# Patient Record
Sex: FEMALE | Race: WHITE | NOT HISPANIC OR LATINO | Employment: FULL TIME | URBAN - METROPOLITAN AREA
[De-identification: names, ages, dates, MRNs, and addresses within clinical notes are randomized per-mention and may not be internally consistent; named-entity substitution may affect disease eponyms.]

---

## 2017-05-12 ENCOUNTER — HOSPITAL ENCOUNTER (OUTPATIENT)
Dept: RADIOLOGY | Facility: MEDICAL CENTER | Age: 57
Discharge: HOME | End: 2017-05-12
Payer: COMMERCIAL

## 2017-05-12 ENCOUNTER — ALLSCRIPTS OFFICE VISIT (OUTPATIENT)
Dept: OTHER | Facility: OTHER | Age: 57
End: 2017-05-12

## 2017-05-12 DIAGNOSIS — R22.1 LOCALIZED SWELLING, MASS OR LUMP OF NECK: ICD-10-CM

## 2017-05-12 DIAGNOSIS — Z72.0 TOBACCO USE: ICD-10-CM

## 2017-05-12 DIAGNOSIS — R03.0 ELEVATED BLOOD PRESSURE READING WITHOUT DIAGNOSIS OF HYPERTENSION: ICD-10-CM

## 2017-05-12 DIAGNOSIS — R19.7 DIARRHEA: ICD-10-CM

## 2017-05-12 DIAGNOSIS — K21.9 GASTRO-ESOPHAGEAL REFLUX DISEASE WITHOUT ESOPHAGITIS: ICD-10-CM

## 2017-05-12 DIAGNOSIS — R06.83 SNORING: ICD-10-CM

## 2017-05-12 DIAGNOSIS — R93.89 ABNORMAL FINDINGS ON DIAGNOSTIC IMAGING OF OTHER SPECIFIED BODY STRUCTURES: ICD-10-CM

## 2017-05-12 DIAGNOSIS — R04.2 HEMOPTYSIS: ICD-10-CM

## 2017-05-12 PROCEDURE — 71020 HB CHEST X-RAY 2VW FRONTAL&LATL: CPT

## 2017-05-13 ENCOUNTER — APPOINTMENT (OUTPATIENT)
Dept: LAB | Facility: MEDICAL CENTER | Age: 57
End: 2017-05-13
Payer: COMMERCIAL

## 2017-05-13 DIAGNOSIS — R03.0 ELEVATED BLOOD PRESSURE READING WITHOUT DIAGNOSIS OF HYPERTENSION: ICD-10-CM

## 2017-05-13 DIAGNOSIS — R04.2 HEMOPTYSIS: ICD-10-CM

## 2017-05-13 DIAGNOSIS — R19.7 DIARRHEA: ICD-10-CM

## 2017-05-13 DIAGNOSIS — R22.1 LOCALIZED SWELLING, MASS OR LUMP OF NECK: ICD-10-CM

## 2017-05-13 DIAGNOSIS — K21.9 GASTRO-ESOPHAGEAL REFLUX DISEASE WITHOUT ESOPHAGITIS: ICD-10-CM

## 2017-05-13 DIAGNOSIS — R06.83 SNORING: ICD-10-CM

## 2017-05-13 LAB
ALBUMIN SERPL BCP-MCNC: 3.8 G/DL (ref 3.5–5)
ALP SERPL-CCNC: 71 U/L (ref 46–116)
ALT SERPL W P-5'-P-CCNC: 32 U/L (ref 12–78)
ANION GAP SERPL CALCULATED.3IONS-SCNC: 8 MMOL/L (ref 4–13)
AST SERPL W P-5'-P-CCNC: 19 U/L (ref 5–45)
BASOPHILS # BLD AUTO: 0.03 THOUSANDS/ΜL (ref 0–0.1)
BASOPHILS NFR BLD AUTO: 1 % (ref 0–1)
BILIRUB SERPL-MCNC: 0.5 MG/DL (ref 0.2–1)
BUN SERPL-MCNC: 15 MG/DL (ref 5–25)
CALCIUM SERPL-MCNC: 9.5 MG/DL (ref 8.3–10.1)
CHLORIDE SERPL-SCNC: 104 MMOL/L (ref 100–108)
CHOLEST SERPL-MCNC: 256 MG/DL (ref 50–200)
CO2 SERPL-SCNC: 27 MMOL/L (ref 21–32)
CREAT SERPL-MCNC: 0.72 MG/DL (ref 0.6–1.3)
EOSINOPHIL # BLD AUTO: 0.13 THOUSAND/ΜL (ref 0–0.61)
EOSINOPHIL NFR BLD AUTO: 2 % (ref 0–6)
ERYTHROCYTE [DISTWIDTH] IN BLOOD BY AUTOMATED COUNT: 12.4 % (ref 11.6–15.1)
GFR SERPL CREATININE-BSD FRML MDRD: >60 ML/MIN/1.73SQ M
GLUCOSE P FAST SERPL-MCNC: 80 MG/DL (ref 65–99)
HCT VFR BLD AUTO: 50.3 % (ref 34.8–46.1)
HDLC SERPL-MCNC: 56 MG/DL (ref 40–60)
HGB BLD-MCNC: 16.1 G/DL (ref 11.5–15.4)
LDLC SERPL CALC-MCNC: 180 MG/DL (ref 0–100)
LYMPHOCYTES # BLD AUTO: 2.14 THOUSANDS/ΜL (ref 0.6–4.47)
LYMPHOCYTES NFR BLD AUTO: 32 % (ref 14–44)
MCH RBC QN AUTO: 30.7 PG (ref 26.8–34.3)
MCHC RBC AUTO-ENTMCNC: 32 G/DL (ref 31.4–37.4)
MCV RBC AUTO: 96 FL (ref 82–98)
MONOCYTES # BLD AUTO: 0.66 THOUSAND/ΜL (ref 0.17–1.22)
MONOCYTES NFR BLD AUTO: 10 % (ref 4–12)
NEUTROPHILS # BLD AUTO: 3.68 THOUSANDS/ΜL (ref 1.85–7.62)
NEUTS SEG NFR BLD AUTO: 55 % (ref 43–75)
NRBC BLD AUTO-RTO: 0 /100 WBCS
PLATELET # BLD AUTO: 249 THOUSANDS/UL (ref 149–390)
PMV BLD AUTO: 11.5 FL (ref 8.9–12.7)
POTASSIUM SERPL-SCNC: 4.2 MMOL/L (ref 3.5–5.3)
PROT SERPL-MCNC: 7.2 G/DL (ref 6.4–8.2)
RBC # BLD AUTO: 5.25 MILLION/UL (ref 3.81–5.12)
SODIUM SERPL-SCNC: 139 MMOL/L (ref 136–145)
TRIGL SERPL-MCNC: 100 MG/DL
TSH SERPL DL<=0.05 MIU/L-ACNC: 1.94 UIU/ML (ref 0.36–3.74)
WBC # BLD AUTO: 6.66 THOUSAND/UL (ref 4.31–10.16)

## 2017-05-13 PROCEDURE — 84443 ASSAY THYROID STIM HORMONE: CPT

## 2017-05-13 PROCEDURE — 36415 COLL VENOUS BLD VENIPUNCTURE: CPT

## 2017-05-13 PROCEDURE — 80061 LIPID PANEL: CPT

## 2017-05-13 PROCEDURE — 85025 COMPLETE CBC W/AUTO DIFF WBC: CPT

## 2017-05-13 PROCEDURE — 80053 COMPREHEN METABOLIC PANEL: CPT

## 2017-05-15 ENCOUNTER — GENERIC CONVERSION - ENCOUNTER (OUTPATIENT)
Dept: OTHER | Facility: OTHER | Age: 57
End: 2017-05-15

## 2017-05-16 ENCOUNTER — HOSPITAL ENCOUNTER (OUTPATIENT)
Dept: RADIOLOGY | Facility: MEDICAL CENTER | Age: 57
Discharge: HOME | End: 2017-05-16
Payer: COMMERCIAL

## 2017-05-16 DIAGNOSIS — Z72.0 TOBACCO USE: ICD-10-CM

## 2017-05-17 ENCOUNTER — GENERIC CONVERSION - ENCOUNTER (OUTPATIENT)
Dept: OTHER | Facility: OTHER | Age: 57
End: 2017-05-17

## 2017-05-24 ENCOUNTER — HOSPITAL ENCOUNTER (OUTPATIENT)
Dept: RADIOLOGY | Facility: MEDICAL CENTER | Age: 57
Discharge: HOME | End: 2017-05-24
Payer: COMMERCIAL

## 2017-05-24 DIAGNOSIS — R22.1 LOCALIZED SWELLING, MASS OR LUMP OF NECK: ICD-10-CM

## 2017-05-24 PROCEDURE — 76536 US EXAM OF HEAD AND NECK: CPT

## 2017-05-25 ENCOUNTER — GENERIC CONVERSION - ENCOUNTER (OUTPATIENT)
Dept: OTHER | Facility: OTHER | Age: 57
End: 2017-05-25

## 2017-06-02 ENCOUNTER — HOSPITAL ENCOUNTER (OUTPATIENT)
Dept: RADIOLOGY | Facility: MEDICAL CENTER | Age: 57
Discharge: HOME | End: 2017-06-02
Payer: COMMERCIAL

## 2017-06-02 ENCOUNTER — HOSPITAL ENCOUNTER (EMERGENCY)
Facility: HOSPITAL | Age: 57
Discharge: HOME | End: 2017-06-02
Attending: EMERGENCY MEDICINE
Payer: COMMERCIAL

## 2017-06-02 VITALS
RESPIRATION RATE: 18 BRPM | TEMPERATURE: 97.8 F | WEIGHT: 195.55 LBS | SYSTOLIC BLOOD PRESSURE: 137 MMHG | HEART RATE: 68 BPM | DIASTOLIC BLOOD PRESSURE: 98 MMHG | OXYGEN SATURATION: 96 %

## 2017-06-02 DIAGNOSIS — R93.89 ABNORMAL FINDINGS ON DIAGNOSTIC IMAGING OF OTHER SPECIFIED BODY STRUCTURES: ICD-10-CM

## 2017-06-02 DIAGNOSIS — T78.2XXA: Primary | ICD-10-CM

## 2017-06-02 DIAGNOSIS — R22.1 LOCALIZED SWELLING, MASS OR LUMP OF NECK: ICD-10-CM

## 2017-06-02 PROCEDURE — 96360 HYDRATION IV INFUSION INIT: CPT

## 2017-06-02 PROCEDURE — 70491 CT SOFT TISSUE NECK W/DYE: CPT

## 2017-06-02 PROCEDURE — 99284 EMERGENCY DEPT VISIT MOD MDM: CPT

## 2017-06-02 RX ORDER — ASCORBIC ACID 500 MG
1000 TABLET ORAL DAILY
COMMUNITY

## 2017-06-02 RX ORDER — MULTIVIT-MIN/IRON FUM/FOLIC AC 7.5 MG-4
2 TABLET ORAL DAILY
COMMUNITY

## 2017-06-02 RX ADMIN — IOHEXOL 85 ML: 350 INJECTION, SOLUTION INTRAVENOUS at 13:09

## 2017-06-02 RX ADMIN — SODIUM CHLORIDE 1000 ML: 0.9 INJECTION, SOLUTION INTRAVENOUS at 15:01

## 2017-06-05 ENCOUNTER — GENERIC CONVERSION - ENCOUNTER (OUTPATIENT)
Dept: OTHER | Facility: OTHER | Age: 57
End: 2017-06-05

## 2017-06-13 ENCOUNTER — ALLSCRIPTS OFFICE VISIT (OUTPATIENT)
Dept: OTHER | Facility: OTHER | Age: 57
End: 2017-06-13

## 2017-06-13 DIAGNOSIS — E04.1 NONTOXIC SINGLE THYROID NODULE: ICD-10-CM

## 2017-08-14 ENCOUNTER — ALLSCRIPTS OFFICE VISIT (OUTPATIENT)
Dept: OTHER | Facility: OTHER | Age: 57
End: 2017-08-14

## 2017-08-21 ENCOUNTER — ALLSCRIPTS OFFICE VISIT (OUTPATIENT)
Dept: OTHER | Facility: OTHER | Age: 57
End: 2017-08-21

## 2017-09-25 DIAGNOSIS — E78.5 HYPERLIPIDEMIA: ICD-10-CM

## 2018-01-09 NOTE — RESULT NOTES
Verified Results  * CT LUNG SCREENING PROGRAM [id number] 10:22AM Anette Quick Order Number: [id number]    - Patient Instructions: To schedule this appointment, please contact Central Scheduling at [phone]  Test Name Result Flag Reference   CT LUNG SCREENING PROGRAM (Report)     CT CHEST LUNG CANCER SCREENING WITHOUT IV CONTRAST     INDICATION: Long term smoking history  Occasional chest tightness  COMPARISON: 5/12/2015     TECHNIQUE: Unenhanced CT examination of the chest was performed utilizing a low dose protocol  Reformatted images were created in axial, sagittal, and coronal planes  Radiation dose length product (DLP) for this visit: 130 mGy-cm   This examination, like all CT scans performed in the [facility], was performed utilizing techniques to minimize radiation dose exposure, including the use of iterative    reconstruction and automated exposure control  FINDINGS:     LUNGS: Unremarkable  No pulmonary mass  No tracheal or endobronchial lesion  PLEURA: Unremarkable  HEART/GREAT VESSELS: Unremarkable for patient's age  MEDIASTINUM AND PANFILO: Unremarkable  CHEST WALL AND LOWER NECK: Unremarkable  VISUALIZED STRUCTURES IN THE UPPER ABDOMEN: Unremarkable status post cholecystectomy  OSSEOUS STRUCTURES: No acute fracture  No destructive osseous lesion  IMPRESSION:     No lung nodule or focal consolidation  Lung-RADS: Lung-RADS1, negative  Continued annual screening with LDCT in 12 months  Workstation performed: [id number]     Signed by:    Edwin Ralph MD   5/16/17

## 2018-01-11 NOTE — RESULT NOTES
Verified Results  (1) CBC/PLT/DIFF [id number] 07:28AM Karmen Camara Order Number: [id number]     Test Name Result Flag Reference   WBC COUNT 6 66 Thousand/uL  4 31-10 16   RBC COUNT 5 25 Million/uL H 3 81-5 12   HEMOGLOBIN 16 1 g/dL H 11 5-15 4   HEMATOCRIT 50 3 % H 34 8-46  1   MCV 96 fL  82-98   MCH 30 7 pg  26 8-34 3   MCHC 32 0 g/dL  31 4-37 4   RDW 12 4 %  11 6-15 1   MPV 11 5 fL  8 9-12 7   PLATELET COUNT 880 Thousands/uL  149-390   nRBC AUTOMATED 0 /100 WBCs     NEUTROPHILS RELATIVE PERCENT 55 %  43-75   LYMPHOCYTES RELATIVE PERCENT 32 %  14-44   MONOCYTES RELATIVE PERCENT 10 %  4-12   EOSINOPHILS RELATIVE PERCENT 2 %  0-6   BASOPHILS RELATIVE PERCENT 1 %  0-1   NEUTROPHILS ABSOLUTE COUNT 3 68 Thousands/? ??L  1 85-7 62   LYMPHOCYTES ABSOLUTE COUNT 2 14 Thousands/? ??L  0 60-4 47   MONOCYTES ABSOLUTE COUNT 0 66 Thousand/? ??L  0 17-1 22   EOSINOPHILS ABSOLUTE COUNT 0 13 Thousand/? ??L  0 00-0 61   BASOPHILS ABSOLUTE COUNT 0 03 Thousands/? ??L  0 00-0 10     (1) COMPREHENSIVE METABOLIC PANEL [id number] 62:09TO Karmen Camara Order Number: [id number]     Test Name Result Flag Reference   SODIUM 139 mmol/L  136-145   POTASSIUM 4 2 mmol/L  3 5-5 3   CHLORIDE 104 mmol/L  100-108   CARBON DIOXIDE 27 mmol/L  21-32   ANION GAP (CALC) 8 mmol/L  4-13   BLOOD UREA NITROGEN 15 mg/dL  5-25   CREATININE 0 72 mg/dL  0 60-1 30   Standardized to IDMS reference method   CALCIUM 9 5 mg/dL  8 3-10 1   BILI, TOTAL 0 50 mg/dL  0 20-1 00   ALK PHOSPHATAS 71 U/L     ALT (SGPT) 32 U/L  12-78   AST(SGOT) 19 U/L  5-45   ALBUMIN 3 8 g/dL  3 5-5 0   TOTAL PROTEIN 7 2 g/dL  6 4-8 2   eGFR Non-African American      >60 0 ml/min/1 73sq m   [facility] Disease Education Program recommendations are as follows:  GFR calculation is accurate only with a steady state creatinine  Chronic Kidney disease less than 60 ml/min/1 73 sq  meters  Kidney failure less than 15 ml/min/1 73 sq  meters     GLUCOSE FASTING 80 mg/dL  65-99     (1) TSH WITH FT4 REFLEX [id number] 07:28AM Selene Hidedel Order Number: [id number]     Test Name Result Flag Reference   TSH 1 940 uIU/mL  0 358-3 740   Patients undergoing fluorescein dye angiography may retain small amounts of fluorescein in the body for 48-72 hours post procedure  Samples containing fluorescein can produce falsely depressed TSH values  If the patient had this procedure,a specimen should be resubmitted post fluorescein clearance  The recommended reference ranges for TSH during pregnancy are as follows:  First trimester 0 1 to 2 5 uIU/mL  Second trimester  0 2 to 3 0 uIU/mL  Third trimester 0 3 to 3 0 uIU/m     (1) LIPID PANEL, FASTING [id number] 07:28AM Selene Hidedel Order Number: [id number]     Test Name Result Flag Reference   CHOLESTEROL 256 mg/dL H    HDL,DIRECT 56 mg/dL  40-60   Specimen collection should occur prior to Metamizole administration due to the potential for falsely depressed results  LDL CHOLESTEROL CALCULATED 180 mg/dL H 0-100   Triglyceride:         Normal              <150 mg/dl       Borderline High    150-199 mg/dl       High               200-499 mg/dl       Very High          >499 mg/dl  Cholesterol:         Desirable        <200 mg/dl      Borderline High  200-239 mg/dl      High             >239 mg/dl  HDL Cholesterol:        High    >59 mg/dL      Low     <41 mg/dL  LDL CALCULATED:    This screening LDL is a calculated result  It does not have the accuracy of the Direct Measured LDL in the monitoring of patients with hyperlipidemia and/or statin therapy  Direct Measure LDL (FHO383) must be ordered separately in these patients  TRIGLYCERIDES 100 mg/dL  <=150   Specimen collection should occur prior to N-Acetylcysteine or Metamizole administration due to the potential for falsely depressed results       * XR CHEST PA & LATERAL [id number] 11:08AM Selene Hidedel Order Number: [id number]     Test Name Result Flag Reference   XR CHEST PA & LATERAL (Report)     CHEST - DUAL ENERGY     INDICATION: Hemoptysis     COMPARISON: CT chest 5/12/2015     VIEWS: PA (including soft tissue/bone algorithms) and lateral projections     IMAGES: 4     FINDINGS:        Cardiomediastinal silhouette appears unremarkable  The lungs are clear  No pneumothorax or pleural effusion  Visualized osseous structures appear within normal limits for the patient's age  IMPRESSION:     No active pulmonary disease         Workstation performed: [id number]     Signed by:   Jhonny Howe MD   5/13/17

## 2018-01-13 VITALS
HEIGHT: 66 IN | HEART RATE: 84 BPM | SYSTOLIC BLOOD PRESSURE: 140 MMHG | WEIGHT: 190.31 LBS | DIASTOLIC BLOOD PRESSURE: 80 MMHG | BODY MASS INDEX: 30.58 KG/M2

## 2018-01-13 NOTE — RESULT NOTES
Verified Results  [address] [place] [address] SOFT TISSUE [id number] 10:26AM Valarie Precise Order Number: [id number]   Performing Comments: Right side neck mass   - Patient Instructions: To schedule this appointment, please contact Central Scheduling at [phone]  Test Name Result Flag Reference   US HEAD NECK SOFT TISSUE (Report)     NECK ULTRASOUND     INDICATION: Palpable lump posterior right neck for 2 months, nontender  COMPARISON: None     TECHNIQUE:  Real-time ultrasound of the right neck in the area of palpable concern was performed with a linear transducer with both volumetric sweeps and still imaging techniques  Comparison images of the left neck were also provided  FINDINGS:    There is an area of shadowing which appears to be related to a calcified structure in the posterior right neck in the area of concern measuring up to 1 2 cm  This appears deep to the superficial strap muscles  Similar findings are seen on the left side  Findings raise the possibility of calcification of the deep neck muscles or possibly calcified lymph nodes  The skin and subcutaneous tissues are within normal limits  IMPRESSION:     Areas of shadowing likely related to calcified structures are seen in the posterior neck bilaterally and appear to correspond to the palpable area in the posterior right neck  Although etiology is unclear, considerations include calcification of deep    neck muscles or possibly calcified lymph nodes  CT neck with IV contrast is recommended for further evaluation  ##sigslh##sigslh         ##fuslh3##fuslh3          Workstation performed: [id number]     Signed by:   Cindy Morris DO   5/24/17       Plan  Abnormal ultrasound of neck, Neck mass    · * CT SOFT TISSUE NECK W CONTRAST; Status:Need Information - Financial  Authorization;  Requested for:[id number];

## 2018-01-14 VITALS
WEIGHT: 190 LBS | RESPIRATION RATE: 18 BRPM | SYSTOLIC BLOOD PRESSURE: 146 MMHG | DIASTOLIC BLOOD PRESSURE: 88 MMHG | BODY MASS INDEX: 31.14 KG/M2 | HEART RATE: 86 BPM | TEMPERATURE: 98 F

## 2018-01-14 VITALS
TEMPERATURE: 98.6 F | BODY MASS INDEX: 30.86 KG/M2 | SYSTOLIC BLOOD PRESSURE: 126 MMHG | DIASTOLIC BLOOD PRESSURE: 82 MMHG | WEIGHT: 192 LBS | HEIGHT: 66 IN

## 2018-01-15 VITALS
DIASTOLIC BLOOD PRESSURE: 80 MMHG | RESPIRATION RATE: 16 BRPM | SYSTOLIC BLOOD PRESSURE: 110 MMHG | WEIGHT: 191.13 LBS | HEART RATE: 76 BPM | BODY MASS INDEX: 31.32 KG/M2

## 2018-01-15 NOTE — RESULT NOTES
Verified Results  * CT SOFT TISSUE NECK W CONTRAST 02Jun2017 12:10PM Efrain Sharif Order Number: [id number]   Performing Comments: [id number] VALID 5/25/17 TO 6/24/17 [address] [place] [address]   - Patient Instructions: To schedule this appointment, please contact Central Scheduling at [phone]  Test Name Result Flag Reference   CT SOFT TISSUE NECK W CONTRAST (Report)     CT NECK WITH CONTRAST     INDICATION: 78-year-old female, right neck mass     COMPARISON: 5/24/2017 ultrasound     TECHNIQUE: Contiguous 2 5 mm images were obtained through the neck after administration of intravenous contrast       Radiation dose length product (DLP) for this visit: 692 mGy-cm   This examination, like all CT scans performed in the [facility], was performed utilizing techniques to minimize radiation dose exposure, including the use of iterative    reconstruction and automated exposure control  The patient developed urticaria reaction after contrast administration  She was sent immediately to Dr Davian Barry office and subsequently to the emergency room for assessment  A radiopaque BB was placed on the posterolateral aspect of the right neck, corresponding to site of complaints  IV Contrast: 85 mL of iohexol (OMNIPAQUE)        IMAGE QUALITY: Diagnostic  FINDINGS:     VISUALIZED BRAIN PARENCHYMA: No acute intracranial pathology of the visualized brain parenchyma  VISUALIZED ORBITS AND PARANASAL SINUSES: Normal      NASAL CAVITY AND NASOPHARYNX: Normal      SUPRAHYOID NECK: Normal oral cavity, tongue base, tonsillar fossa and epiglottis  Prevertebral soft tissues are normal         INFRAHYOID NECK: Aryepiglottic folds, laryngeal tissues, and piriform sinuses are normal         THYROID GLAND: Approximately 1 cm enhancing nodule arises from the superficial aspect of the right lobe of the thyroid gland  Ultrasound-guided biopsy may be warranted   This was not visible on the prior ultrasound which was dedicated to specifically    address the more cephalad portion of the neck  PAROTID AND SUBMANDIBULAR GLANDS: Normal      LYMPH NODES: No pathologic or enlarged adenopathy  VASCULAR STRUCTURES: Normal enhancement of the cervical vasculature  THORACIC INLET: Lung apices and upper mediastinum are unremarkable  BONY STRUCTURES: There is prominent facet arthrosis noted at C3-4, C4-5 and C5-6 levels with lateral hypertrophy which appears to correspond to site of patient complaints on the right side  IMPRESSION:   Allergic urticaria reaction after IV contrast managed as described above     Approximately 1 cm indeterminate right lobe thyroid nodule     Multilevel degenerative facet arthrosis with lateral prominence which appears to correspond to site of patient complaints     No evidence of deep right neck soft tissue calcifications to correlate with ultrasound findings     ##sigslh##sigslh               Workstation performed: [id number]     Signed by:   Macrina Conner MD   6/2/17       Plan  Thyroid nodule    · *1 - [facility]  1cm right thyroid nodue    Status: Active   Requested for: [id number]  Care Summary provided  : Yes

## 2018-12-28 ENCOUNTER — OFFICE VISIT (OUTPATIENT)
Dept: OBGYN CLINIC | Facility: MEDICAL CENTER | Age: 58
End: 2018-12-28
Payer: COMMERCIAL

## 2018-12-28 VITALS — DIASTOLIC BLOOD PRESSURE: 100 MMHG | BODY MASS INDEX: 30.48 KG/M2 | WEIGHT: 186 LBS | SYSTOLIC BLOOD PRESSURE: 150 MMHG

## 2018-12-28 DIAGNOSIS — N64.4 BREAST PAIN: Primary | ICD-10-CM

## 2018-12-28 PROCEDURE — 99213 OFFICE O/P EST LOW 20 MIN: CPT | Performed by: PHYSICIAN ASSISTANT

## 2018-12-28 NOTE — PROGRESS NOTES
Assessment/Plan:    Breast pain  Will check imaging, f/u with results when available  To notify should s/sx infection recur    Enc to f/u with PCP re: multiple health concerns, reviewed importance of routine health maintenance exams and studies    Pt expressed understanding and states she will do so  Smoking cessation advised  Declines repeat BP - asymptomatic     Diagnoses and all orders for this visit:    Breast pain  -     US breast left limited (diagnostic); Future  -     Mammo diagnostic left w 3d & cad; Future        Subjective:      Patient ID: Karma Liz is a 59 y o  female  Pt presents for eval of L breast pain  States that approx a week ago, noted pain in L lateral breast  Seemed to worsen over next 1-2 days - when she viewed the area, noticed a pimple-like lesion  Squeezed the lesion and expressed copious discharge  States change in skin no longer present, but area still painful and feels 'lump' under surface  Last mammo 2016, WNL  Family history of breast cancer in maternal aunt at age 45 and ovarian cancer in maternal grandmother at age 80  Benign excisional biopsy of the left breast, 1989 - results benign  Is current everyday smoker    Pt reports she has lost weight over past several months  BP noted to be elevated today  H/o known thyroid nodules        The following portions of the patient's history were reviewed and updated as appropriate: allergies, current medications, past family history, past medical history, past social history, past surgical history and problem list     Review of Systems   Constitutional: Negative for appetite change, fatigue and unexpected weight change  Respiratory: Negative for chest tightness and shortness of breath  Cardiovascular: Negative for chest pain, palpitations and leg swelling  Gastrointestinal: Negative for abdominal pain, constipation, diarrhea, nausea and vomiting     Genitourinary: Negative for difficulty urinating, dyspareunia, menstrual problem, pelvic pain and vaginal discharge  Musculoskeletal: Negative for arthralgias and myalgias  Neurological: Negative for dizziness, light-headedness and headaches  Psychiatric/Behavioral: Negative for dysphoric mood  The patient is not nervous/anxious  All other systems reviewed and are negative  Objective:      /100 (BP Location: Left arm, Patient Position: Sitting)   Wt 84 4 kg (186 lb)   BMI 30 48 kg/m²          Physical Exam   Constitutional: She is oriented to person, place, and time  She appears well-developed and well-nourished  HENT:   Head: Normocephalic and atraumatic  Pulmonary/Chest: Right breast exhibits no mass, no nipple discharge, no skin change and no tenderness  Left breast exhibits mass and tenderness  Left breast exhibits no nipple discharge and no skin change  Breasts are symmetrical    Lymphadenopathy:     She has no axillary adenopathy  Neurological: She is alert and oriented to person, place, and time  Skin: Skin is warm and dry  Psychiatric: She has a normal mood and affect  Nursing note and vitals reviewed

## 2018-12-28 NOTE — ASSESSMENT & PLAN NOTE
Will check imaging, f/u with results when available  To notify should s/sx infection recur    Enc to f/u with PCP re: multiple health concerns, reviewed importance of routine health maintenance exams and studies  Declines BP recheck, asymptomatic

## 2019-01-07 ENCOUNTER — HOSPITAL ENCOUNTER (OUTPATIENT)
Dept: MAMMOGRAPHY | Facility: CLINIC | Age: 59
Discharge: HOME | End: 2019-01-07
Payer: COMMERCIAL

## 2019-01-07 ENCOUNTER — HOSPITAL ENCOUNTER (OUTPATIENT)
Dept: ULTRASOUND IMAGING | Facility: CLINIC | Age: 59
Discharge: HOME | End: 2019-01-07
Payer: COMMERCIAL

## 2019-01-07 VITALS — BODY MASS INDEX: 29.73 KG/M2 | WEIGHT: 185 LBS | HEIGHT: 66 IN

## 2019-01-07 DIAGNOSIS — N64.4 MASTODYNIA: ICD-10-CM

## 2019-01-07 DIAGNOSIS — N64.4 BREAST PAIN: ICD-10-CM

## 2019-01-07 PROCEDURE — 77066 DX MAMMO INCL CAD BI: CPT

## 2019-01-07 PROCEDURE — G0279 TOMOSYNTHESIS, MAMMO: HCPCS

## 2019-01-07 PROCEDURE — 76642 ULTRASOUND BREAST LIMITED: CPT

## 2019-01-08 ENCOUNTER — TELEPHONE (OUTPATIENT)
Dept: OBGYN CLINIC | Facility: CLINIC | Age: 59
End: 2019-01-08

## 2019-01-08 NOTE — TELEPHONE ENCOUNTER
----- Message from Bob Sue PA-C sent at 1/8/2019 12:32 PM EST -----  Imaging WNL  Cont w/ routine screening mammography  thanks

## 2019-04-02 ENCOUNTER — OFFICE VISIT (OUTPATIENT)
Dept: FAMILY MEDICINE CLINIC | Facility: MEDICAL CENTER | Age: 59
End: 2019-04-02
Payer: COMMERCIAL

## 2019-04-02 VITALS — WEIGHT: 184 LBS | BODY MASS INDEX: 29.7 KG/M2 | RESPIRATION RATE: 16 BRPM

## 2019-04-02 DIAGNOSIS — E78.5 HYPERLIPIDEMIA, UNSPECIFIED HYPERLIPIDEMIA TYPE: ICD-10-CM

## 2019-04-02 DIAGNOSIS — E04.1 THYROID NODULE: ICD-10-CM

## 2019-04-02 DIAGNOSIS — Z72.0 TOBACCO USE: Primary | ICD-10-CM

## 2019-04-02 DIAGNOSIS — Z11.59 NEED FOR HEPATITIS C SCREENING TEST: ICD-10-CM

## 2019-04-02 DIAGNOSIS — Z13.1 SCREENING FOR DIABETES MELLITUS: ICD-10-CM

## 2019-04-02 DIAGNOSIS — R71.8 ELEVATED RED BLOOD CELL COUNT: ICD-10-CM

## 2019-04-02 DIAGNOSIS — Z12.2 ENCOUNTER FOR SCREENING FOR LUNG CANCER: ICD-10-CM

## 2019-04-02 PROBLEM — R22.1 NECK MASS: Status: ACTIVE | Noted: 2017-05-12

## 2019-04-02 PROBLEM — M47.812 CERVICAL ARTHRITIS: Status: ACTIVE | Noted: 2017-06-05

## 2019-04-02 PROBLEM — R93.89 ABNORMAL ULTRASOUND OF NECK: Status: ACTIVE | Noted: 2017-05-25

## 2019-04-02 PROBLEM — K21.9 ACID REFLUX: Status: ACTIVE | Noted: 2017-05-12

## 2019-04-02 PROBLEM — R06.83 SNORING: Status: ACTIVE | Noted: 2017-05-12

## 2019-04-02 PROCEDURE — 99213 OFFICE O/P EST LOW 20 MIN: CPT | Performed by: FAMILY MEDICINE

## 2019-04-02 RX ORDER — VARENICLINE TARTRATE 25 MG
KIT ORAL
Qty: 53 TABLET | Refills: 0 | Status: SHIPPED | OUTPATIENT
Start: 2019-04-02 | End: 2019-09-03

## 2019-04-11 ENCOUNTER — HOSPITAL ENCOUNTER (OUTPATIENT)
Dept: RADIOLOGY | Facility: MEDICAL CENTER | Age: 59
Discharge: HOME | End: 2019-04-11
Payer: COMMERCIAL

## 2019-04-11 DIAGNOSIS — E04.1 THYROID NODULE: ICD-10-CM

## 2019-04-11 DIAGNOSIS — Z12.2 ENCOUNTER FOR SCREENING FOR LUNG CANCER: ICD-10-CM

## 2019-04-11 PROCEDURE — 76536 US EXAM OF HEAD AND NECK: CPT

## 2019-04-15 ENCOUNTER — TELEPHONE (OUTPATIENT)
Dept: FAMILY MEDICINE CLINIC | Facility: MEDICAL CENTER | Age: 59
End: 2019-04-15

## 2019-04-15 DIAGNOSIS — E04.1 THYROID NODULE: Primary | ICD-10-CM

## 2019-04-26 ENCOUNTER — TRANSCRIBE ORDERS (OUTPATIENT)
Dept: ADMINISTRATIVE | Facility: HOSPITAL | Age: 59
End: 2019-04-26

## 2019-04-26 ENCOUNTER — TELEPHONE (OUTPATIENT)
Dept: FAMILY MEDICINE CLINIC | Facility: MEDICAL CENTER | Age: 59
End: 2019-04-26

## 2019-04-26 DIAGNOSIS — E04.1 THYROID NODULE: Primary | ICD-10-CM

## 2019-04-27 ENCOUNTER — APPOINTMENT (OUTPATIENT)
Dept: LAB | Facility: CLINIC | Age: 59
End: 2019-04-27
Payer: COMMERCIAL

## 2019-04-27 DIAGNOSIS — Z11.59 NEED FOR HEPATITIS C SCREENING TEST: ICD-10-CM

## 2019-04-27 DIAGNOSIS — Z13.1 SCREENING FOR DIABETES MELLITUS: ICD-10-CM

## 2019-04-27 DIAGNOSIS — R71.8 ELEVATED RED BLOOD CELL COUNT: ICD-10-CM

## 2019-04-27 DIAGNOSIS — E78.5 HYPERLIPIDEMIA, UNSPECIFIED HYPERLIPIDEMIA TYPE: ICD-10-CM

## 2019-04-27 LAB
ALBUMIN SERPL BCP-MCNC: 4 G/DL (ref 3.5–5)
ALP SERPL-CCNC: 82 U/L (ref 46–116)
ALT SERPL W P-5'-P-CCNC: 28 U/L (ref 12–78)
ANION GAP SERPL CALCULATED.3IONS-SCNC: 4 MMOL/L (ref 4–13)
AST SERPL W P-5'-P-CCNC: 20 U/L (ref 5–45)
BASOPHILS # BLD AUTO: 0.04 THOUSANDS/ΜL (ref 0–0.1)
BASOPHILS NFR BLD AUTO: 1 % (ref 0–1)
BILIRUB SERPL-MCNC: 0.43 MG/DL (ref 0.2–1)
BUN SERPL-MCNC: 12 MG/DL (ref 5–25)
CALCIUM SERPL-MCNC: 8.6 MG/DL (ref 8.3–10.1)
CHLORIDE SERPL-SCNC: 107 MMOL/L (ref 100–108)
CHOLEST SERPL-MCNC: 266 MG/DL (ref 50–200)
CO2 SERPL-SCNC: 30 MMOL/L (ref 21–32)
CREAT SERPL-MCNC: 0.72 MG/DL (ref 0.6–1.3)
EOSINOPHIL # BLD AUTO: 0.2 THOUSAND/ΜL (ref 0–0.61)
EOSINOPHIL NFR BLD AUTO: 3 % (ref 0–6)
ERYTHROCYTE [DISTWIDTH] IN BLOOD BY AUTOMATED COUNT: 11.7 % (ref 11.6–15.1)
EST. AVERAGE GLUCOSE BLD GHB EST-MCNC: 123 MG/DL
GFR SERPL CREATININE-BSD FRML MDRD: 91 ML/MIN/1.73SQ M
GLUCOSE P FAST SERPL-MCNC: 96 MG/DL (ref 65–99)
HBA1C MFR BLD: 5.9 % (ref 4.2–6.3)
HCT VFR BLD AUTO: 51.1 % (ref 34.8–46.1)
HDLC SERPL-MCNC: 55 MG/DL (ref 40–60)
HGB BLD-MCNC: 16.1 G/DL (ref 11.5–15.4)
IMM GRANULOCYTES # BLD AUTO: 0.02 THOUSAND/UL (ref 0–0.2)
IMM GRANULOCYTES NFR BLD AUTO: 0 % (ref 0–2)
LDLC SERPL CALC-MCNC: 189 MG/DL (ref 0–100)
LYMPHOCYTES # BLD AUTO: 1.87 THOUSANDS/ΜL (ref 0.6–4.47)
LYMPHOCYTES NFR BLD AUTO: 32 % (ref 14–44)
MCH RBC QN AUTO: 30.1 PG (ref 26.8–34.3)
MCHC RBC AUTO-ENTMCNC: 31.5 G/DL (ref 31.4–37.4)
MCV RBC AUTO: 96 FL (ref 82–98)
MONOCYTES # BLD AUTO: 0.55 THOUSAND/ΜL (ref 0.17–1.22)
MONOCYTES NFR BLD AUTO: 9 % (ref 4–12)
NEUTROPHILS # BLD AUTO: 3.21 THOUSANDS/ΜL (ref 1.85–7.62)
NEUTS SEG NFR BLD AUTO: 55 % (ref 43–75)
NRBC BLD AUTO-RTO: 0 /100 WBCS
PLATELET # BLD AUTO: 253 THOUSANDS/UL (ref 149–390)
PMV BLD AUTO: 10.6 FL (ref 8.9–12.7)
POTASSIUM SERPL-SCNC: 4.6 MMOL/L (ref 3.5–5.3)
PROT SERPL-MCNC: 7.2 G/DL (ref 6.4–8.2)
RBC # BLD AUTO: 5.35 MILLION/UL (ref 3.81–5.12)
SODIUM SERPL-SCNC: 141 MMOL/L (ref 136–145)
TRIGL SERPL-MCNC: 111 MG/DL
WBC # BLD AUTO: 5.89 THOUSAND/UL (ref 4.31–10.16)

## 2019-04-27 PROCEDURE — 83036 HEMOGLOBIN GLYCOSYLATED A1C: CPT

## 2019-04-27 PROCEDURE — 85025 COMPLETE CBC W/AUTO DIFF WBC: CPT

## 2019-04-27 PROCEDURE — 80053 COMPREHEN METABOLIC PANEL: CPT

## 2019-04-27 PROCEDURE — 80061 LIPID PANEL: CPT

## 2019-04-27 PROCEDURE — 36415 COLL VENOUS BLD VENIPUNCTURE: CPT

## 2019-04-27 PROCEDURE — 86803 HEPATITIS C AB TEST: CPT

## 2019-04-28 LAB — HCV AB SER QL: NORMAL

## 2019-05-15 ENCOUNTER — HOSPITAL ENCOUNTER (OUTPATIENT)
Dept: RADIOLOGY | Facility: HOSPITAL | Age: 59
Discharge: HOME | End: 2019-05-15
Attending: FAMILY MEDICINE
Payer: COMMERCIAL

## 2019-05-15 DIAGNOSIS — E04.1 THYROID NODULE: ICD-10-CM

## 2019-05-15 PROCEDURE — 88173 CYTOPATH EVAL FNA REPORT: CPT | Performed by: PATHOLOGY

## 2019-05-15 PROCEDURE — 10005 FNA BX W/US GDN 1ST LES: CPT

## 2019-05-15 RX ORDER — LIDOCAINE HYDROCHLORIDE 10 MG/ML
3 INJECTION, SOLUTION INFILTRATION; PERINEURAL ONCE
Status: COMPLETED | OUTPATIENT
Start: 2019-05-15 | End: 2019-05-15

## 2019-05-15 RX ADMIN — LIDOCAINE HYDROCHLORIDE 1 ML: 10 INJECTION, SOLUTION INFILTRATION; PERINEURAL at 09:51

## 2019-08-28 ENCOUNTER — TELEPHONE (OUTPATIENT)
Dept: FAMILY MEDICINE CLINIC | Facility: MEDICAL CENTER | Age: 59
End: 2019-08-28

## 2019-08-28 DIAGNOSIS — Z01.10 ENCOUNTER FOR HEARING EXAMINATION, UNSPECIFIED WHETHER ABNORMAL FINDINGS: Primary | ICD-10-CM

## 2019-08-28 NOTE — TELEPHONE ENCOUNTER
Patient walked in the office requesting a Audiology script to take to her appt that is scheduled on 09/06/19  Audiology dept stated will need an order

## 2019-09-03 ENCOUNTER — OFFICE VISIT (OUTPATIENT)
Dept: FAMILY MEDICINE CLINIC | Facility: MEDICAL CENTER | Age: 59
End: 2019-09-03
Payer: COMMERCIAL

## 2019-09-03 VITALS
WEIGHT: 186.38 LBS | SYSTOLIC BLOOD PRESSURE: 148 MMHG | RESPIRATION RATE: 16 BRPM | HEIGHT: 66 IN | BODY MASS INDEX: 29.95 KG/M2 | HEART RATE: 90 BPM | DIASTOLIC BLOOD PRESSURE: 88 MMHG

## 2019-09-03 DIAGNOSIS — F41.9 ANXIETY: ICD-10-CM

## 2019-09-03 DIAGNOSIS — G47.00 INSOMNIA, UNSPECIFIED TYPE: ICD-10-CM

## 2019-09-03 DIAGNOSIS — F32.2 CURRENT SEVERE EPISODE OF MAJOR DEPRESSIVE DISORDER WITHOUT PSYCHOTIC FEATURES WITHOUT PRIOR EPISODE (HCC): Primary | ICD-10-CM

## 2019-09-03 PROBLEM — F32.A DEPRESSION: Status: ACTIVE | Noted: 2019-09-03

## 2019-09-03 PROCEDURE — 99214 OFFICE O/P EST MOD 30 MIN: CPT | Performed by: FAMILY MEDICINE

## 2019-09-03 RX ORDER — FLUOXETINE HYDROCHLORIDE 20 MG/1
20 CAPSULE ORAL DAILY
Qty: 90 CAPSULE | Refills: 0 | Status: SHIPPED | OUTPATIENT
Start: 2019-09-03 | End: 2019-10-15 | Stop reason: SDUPTHER

## 2019-09-03 NOTE — PROGRESS NOTES
Assessment/Plan:    No problem-specific Assessment & Plan notes found for this encounter  Diagnoses and all orders for this visit:    Current severe episode of major depressive disorder without 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 episode (HCC)  -     FLUoxetine (PROzac) 20 mg capsule; Take 1 capsule (20 mg total) by mouth daily  Anxiety  -     FLUoxetine (PROzac) 20 mg capsule; Take 1 capsule (20 mg total) by mouth daily  Insomnia, unspecified type  -     FLUoxetine (PROzac) 20 mg capsule; Take 1 capsule (20 mg total) by mouth daily  Based on PHQ-9 scoring patient has a severe major depression  On top of this she has associated anxiety and insomnia  She does have suicidal ideation but is not currently suicidal and does not have a plan to commit suicide  I encouraged patient to continue to go to counseling  I recommended we start an SSRI which should help improve her depression, her anxiety and may help her sleep at night  Potential side effects of SSRIs discussed  Patient is agreeable to taking medication  I will have her start Prozac 20 mg daily  Patient was also encouraged to eat healthier and exercise regularly at is in help improve her symptoms as well  Patient was instructed to call the office or go to the ER if her symptoms worsen or in particular if her suicidal ideation becomes more intense  BMI Counseling: Body mass index is 30 08 kg/m²  Discussed the patient's BMI with her  The BMI is above average  BMI counseling and education was provided to the patient  Nutrition recommendations include decreasing overall calorie intake  Exercise recommendations include moderate aerobic physical activity for 150 minutes/week  Follow-up in one month or sooner if needed  Subjective:      Patient ID: Kathryn Dickey is a 62 y o  female  Patient presents with a chief complaint of depression    Describes her depression as little interest in doing things, feeling down, having difficulty sleeping, having little energy, overeating, feelings of guilt, trouble concentrating, being fidgety and suicidal ideation  Patient however has no plans to commit suicide and is not actively suicidal   Symptoms started about 10 years ago  Does have associated anxiety as well as insomnia  Symptoms have been getting progressively worse over the past year  Patient does have symptom improvement when she self medicates herself with alcohol  She is aware however she should not be doing this  Symptoms are exacerbated by stress at home and at work  In particular patient's daughter has been estranged from her  Patient has not been able to see her grandchild  Patient is going to counseling currently and has been seeing China Arriola LCSW for the past 2-3yrs  She would like to start medication  The following portions of the patient's history were reviewed and updated as appropriate:   She  has a past medical history of Anxiety and depression, Bowel disease, Chronic obstructive lung disease ([address] ), Hypercholesterolemia, Hyperlipidemia, IBS (irritable bowel syndrome), Sleep disturbance, Urinary incontinence, and Vocal cord polyp  She   Patient Active Problem List    Diagnosis Date Noted    Depression 09/03/2019    Anxiety 09/03/2019    Insomnia 09/03/2019    Breast pain 12/28/2018    Cervical arthritis 06/05/2017    Thyroid nodule 06/05/2017    Abnormal ultrasound of neck 05/25/2017    Elevated red blood cell count 05/15/2017    Hyperlipidemia 05/15/2017    Acid reflux 05/12/2017    Neck mass 05/12/2017    Snoring 05/12/2017     She  has a past surgical history that includes Cholecystectomy; Breast cyst excision (Left, 1989); Rhinoplasty; and US guided thyroid biopsy (5/15/2019)  Her family history includes Breast cancer (age of onset: 39) in her maternal aunt; Breast cancer (age of onset: 80) in her maternal grandmother; Diabetes in her sister;  Hypertension in her father and mother; Skin cancer in her father; Thyroid cancer in her maternal aunt; Thyroid disease in her sister; Varicose Veins in her mother  She  reports that she has been smoking  She has never used smokeless tobacco  She reports that she does not drink alcohol or use drugs  Current Outpatient Medications   Medication Sig Dispense Refill    ascorbic acid (VITAMIN C) 500 mg tablet Take 1,000 mg by mouth daily      Multiple Vitamins-Minerals (MULTIVITAMIN WITH MINERALS) tablet Take 1 tablet by mouth daily      FLUoxetine (PROzac) 20 mg capsule Take 1 capsule (20 mg total) by mouth daily 90 capsule 0     No current facility-administered medications for this visit  Current Outpatient Medications on File Prior to Visit   Medication Sig    ascorbic acid (VITAMIN C) 500 mg tablet Take 1,000 mg by mouth daily    Multiple Vitamins-Minerals (MULTIVITAMIN WITH MINERALS) tablet Take 1 tablet by mouth daily    [DISCONTINUED] varenicline (CHANTIX STARTING MONTH PAK) 0 5 MG X 11 & 1 MG X 42 tablet Use as directed on package (Patient not taking: Reported on 9/3/2019)     No current facility-administered medications on file prior to visit  She is allergic to iodinated diagnostic agents and amoxicillin-pot clavulanate       Review of Systems   Constitutional: Negative for fever  Respiratory: Negative for shortness of breath  Cardiovascular: Negative for chest pain  Objective:      /88   Pulse 90   Resp 16   Ht 5' 6" (1 676 m)   Wt 84 5 kg (186 lb 6 oz)   BMI 30 08 kg/m²          Physical Exam   Constitutional: She appears well-developed and well-nourished  Psychiatric: Her behavior is normal  Judgment and thought content normal  Her mood appears anxious  She exhibits a depressed mood

## 2019-09-06 ENCOUNTER — OFFICE VISIT (OUTPATIENT)
Dept: AUDIOLOGY | Age: 59
End: 2019-09-06
Payer: COMMERCIAL

## 2019-09-06 DIAGNOSIS — H90.3 SENSORY HEARING LOSS, BILATERAL: Primary | ICD-10-CM

## 2019-09-06 PROCEDURE — 92556 SPEECH AUDIOMETRY COMPLETE: CPT | Performed by: AUDIOLOGIST

## 2019-09-06 PROCEDURE — 92552 PURE TONE AUDIOMETRY AIR: CPT | Performed by: AUDIOLOGIST

## 2019-09-06 PROCEDURE — 92567 TYMPANOMETRY: CPT | Performed by: AUDIOLOGIST

## 2019-09-06 NOTE — PROGRESS NOTES
HEARING EVALUATION    Name:  Dane Rosas  :  1960  Age:  62 y o  Date of Evaluation: 19     History: exam required for employment  Reason for visit: Dane Rosas is being seen today at the request of Dr Michael Long for an evaluation of hearing  Patient is a  and must provide a completed hearing test to her employer  The patient has hearing aids which she acquired at [facility], and states her left ear is worse than her right ear  EVALUATION:    Otoscopic Evaluation:   Right Ear: Minimum cerumen    Left Ear: Minimum cerumen     Tympanometry:   Right: Type A - normal middle ear pressure and compliance   Left: Type A - normal middle ear pressure and compliance    Audiogram Results:  Right ear: Moderately severe hearing loss  WRS is 92%  Left ear:  Severe hearing loss  WRS is 80%  Aided responses per [place] [employer]  form:    Aided Responses in the Soundfield:   500 Hz warble tone:  40dB             1,000 Hz warble tone:  40dB             2,000 Hz warble tone:  45dB    Aided Speech Recognition scores:   Left:  40dB   Right:  25dB    Aided Word Recognition scores (presentation level 50dB, 35dB speech noise presented to opposite ear):   Left:  80%   Right:  76%     *see attached audiogram      Speechmapping revealed left hearing aid does not meet NAL-NL2 targets above 2KHz in the left ear  Right ear low frequencies are above target  ANSI standards were not available for this hearing aid  RECOMMENDATIONS:  Annual hearing eval and Copy to Patient/Caregiver    PATIENT EDUCATION:   Discussed results and recommendations with patient  Questions were addressed and the patient was encouraged to contact our department should concerns arise        Mackenzie Camp , CCC-A  Clinical Audiologist

## 2019-09-24 ENCOUNTER — OFFICE VISIT (OUTPATIENT)
Dept: AUDIOLOGY | Age: 59
End: 2019-09-24
Payer: COMMERCIAL

## 2019-09-24 DIAGNOSIS — H90.3 SENSORY HEARING LOSS, BILATERAL: Primary | ICD-10-CM

## 2019-09-24 NOTE — PROGRESS NOTES
Progress Note    Name:  Sundar Parekh  :  1960  Age:  62 y o  Date of Evaluation: 19     Patient arrived for a re-screening of her aided testing following her hearing adjustment on Saturday with [employer]  [employer] form was completed and copies were provided to the patient          Marian Lima CCC-A  Clinical Audiologist

## 2019-10-15 ENCOUNTER — OFFICE VISIT (OUTPATIENT)
Dept: FAMILY MEDICINE CLINIC | Facility: MEDICAL CENTER | Age: 59
End: 2019-10-15
Payer: COMMERCIAL

## 2019-10-15 VITALS
BODY MASS INDEX: 29.13 KG/M2 | DIASTOLIC BLOOD PRESSURE: 84 MMHG | HEIGHT: 66 IN | SYSTOLIC BLOOD PRESSURE: 138 MMHG | WEIGHT: 181.25 LBS | HEART RATE: 80 BPM | RESPIRATION RATE: 14 BRPM

## 2019-10-15 DIAGNOSIS — F32.2 CURRENT SEVERE EPISODE OF MAJOR DEPRESSIVE DISORDER WITHOUT PSYCHOTIC FEATURES WITHOUT PRIOR EPISODE (HCC): Primary | ICD-10-CM

## 2019-10-15 DIAGNOSIS — F41.9 ANXIETY: ICD-10-CM

## 2019-10-15 DIAGNOSIS — G47.00 INSOMNIA, UNSPECIFIED TYPE: ICD-10-CM

## 2019-10-15 PROCEDURE — 99214 OFFICE O/P EST MOD 30 MIN: CPT | Performed by: FAMILY MEDICINE

## 2019-10-15 PROCEDURE — 3008F BODY MASS INDEX DOCD: CPT | Performed by: FAMILY MEDICINE

## 2019-10-15 RX ORDER — FLUOXETINE HYDROCHLORIDE 40 MG/1
40 CAPSULE ORAL DAILY
Start: 2019-10-15 | End: 2019-10-28 | Stop reason: SDUPTHER

## 2019-10-15 NOTE — PROGRESS NOTES
Assessment/Plan:    No problem-specific Assessment & Plan notes found for this encounter  Diagnoses and all orders for this visit:    Current severe episode of major depressive disorder without 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 episode (HCC)  -     FLUoxetine (PROzac) 40 MG capsule; Take 1 capsule (40 mg total) by mouth daily  Anxiety  -     FLUoxetine (PROzac) 40 MG capsule; Take 1 capsule (40 mg total) by mouth daily  Insomnia, unspecified type  -     FLUoxetine (PROzac) 40 MG capsule; Take 1 capsule (40 mg total) by mouth daily  Insomnia has shown some improvement however depression and anxiety persist   I am going to have patient increase her dose of Prozac from 20 mg daily to 40 mg daily  She will double up on the 20 mg pills for now and if she tolerates the increase she will call for a refill of 40 mg pills  Patient was asked to call me in about 2-3 weeks to see how she is feeling on the higher dose  If she still feels she needs symptom improvement I may increase the dose of Prozac again or add an adjunct therapy such as Wellbutrin  If symptoms worsen or in particular if suicidal ideation worsens with the increase in dose patient is to call the office or go to the ER right away  Follow-up in six weeks or sooner if needed   Subjective:      Patient ID: Josephine Diaz is a 62 y o  female  Patient presents for follow-up of her depression  Describes her depression as little interest in doing things, feeling depressed, having little energy, overeating, feeling of guilt, trouble concentrating, being fidgety and suicidal ideation  Patient is not currently suicidal and has no plans to commit suicide  No longer having difficulty with sleep  Does have associated anxiety however  Symptoms have been getting progressively worse over the past year    Patient does have improvement of her symptoms when she drinks alcohol however she know she should not do this   Symptoms are exacerbated by stress at work and at home particular with her daughter and her grandchildren which she has not seen in some time  She is currently in counseling but has only seen her counselor once since medication was started last visit  Patient is currently on Prozac 20 mg daily but does not feel it has helped to improve her depression or her anxiety  The following portions of the patient's history were reviewed and updated as appropriate:   She  has a past medical history of Anxiety and depression, Bowel disease, Chronic obstructive lung disease ([address] ), Hypercholesterolemia, Hyperlipidemia, IBS (irritable bowel syndrome), Sleep disturbance, Urinary incontinence, and Vocal cord polyp  She   Patient Active Problem List    Diagnosis Date Noted    Depression 09/03/2019    Anxiety 09/03/2019    Insomnia 09/03/2019    Breast pain 12/28/2018    Cervical arthritis 06/05/2017    Thyroid nodule 06/05/2017    Abnormal ultrasound of neck 05/25/2017    Elevated red blood cell count 05/15/2017    Hyperlipidemia 05/15/2017    Acid reflux 05/12/2017    Neck mass 05/12/2017    Snoring 05/12/2017     She  has a past surgical history that includes Cholecystectomy; Breast cyst excision (Left, 1989); Rhinoplasty; and US guided thyroid biopsy (5/15/2019)  Her family history includes Breast cancer (age of onset: 39) in her maternal aunt; Breast cancer (age of onset: 80) in her maternal grandmother; Diabetes in her sister; Hypertension in her father and mother; Skin cancer in her father; Thyroid cancer in her maternal aunt; Thyroid disease in her sister; Varicose Veins in her mother  She  reports that she has been smoking  She has never used smokeless tobacco  She reports that she does not drink alcohol or use drugs    Current Outpatient Medications   Medication Sig Dispense Refill    ascorbic acid (VITAMIN C) 500 mg tablet Take 1,000 mg by mouth daily      FLUoxetine (PROzac) 40 MG capsule Take 1 capsule (40 mg total) by mouth daily      Multiple Vitamins-Minerals (MULTIVITAMIN WITH MINERALS) tablet Take 1 tablet by mouth daily       No current facility-administered medications for this visit  Current Outpatient Medications on File Prior to Visit   Medication Sig    ascorbic acid (VITAMIN C) 500 mg tablet Take 1,000 mg by mouth daily    Multiple Vitamins-Minerals (MULTIVITAMIN WITH MINERALS) tablet Take 1 tablet by mouth daily    [DISCONTINUED] FLUoxetine (PROzac) 20 mg capsule Take 1 capsule (20 mg total) by mouth daily     No current facility-administered medications on file prior to visit  She is allergic to iodinated diagnostic agents and amoxicillin-pot clavulanate       Review of Systems   Constitutional: Negative for fever  Respiratory: Negative for shortness of breath  Cardiovascular: Negative for chest pain  Objective:      /84   Pulse 80   Resp 14   Ht 5' 6" (1 676 m)   Wt 82 2 kg (181 lb 4 oz)   BMI 29 25 kg/m²          Physical Exam   Constitutional: She appears well-developed and well-nourished  Psychiatric: She has a normal mood and affect   Her behavior is normal  Judgment and thought content normal

## 2019-10-28 DIAGNOSIS — G47.00 INSOMNIA, UNSPECIFIED TYPE: ICD-10-CM

## 2019-10-28 DIAGNOSIS — F32.2 CURRENT SEVERE EPISODE OF MAJOR DEPRESSIVE DISORDER WITHOUT PSYCHOTIC FEATURES WITHOUT PRIOR EPISODE (HCC): ICD-10-CM

## 2019-10-28 DIAGNOSIS — F41.9 ANXIETY: ICD-10-CM

## 2019-10-28 RX ORDER — FLUOXETINE HYDROCHLORIDE 40 MG/1
40 CAPSULE ORAL DAILY
Qty: 90 CAPSULE | Refills: 1 | Status: SHIPPED | OUTPATIENT
Start: 2019-10-28 | End: 2021-08-27

## 2019-10-28 NOTE — TELEPHONE ENCOUNTER
Patient ran out of her medicine today and is requesting a refill for Prozac to go to [facility]  Pt states the 40 mg is working better for her

## 2020-02-21 NOTE — LETTER
2019     Jourdan Urrutia DO  [address] [facility] [address]    Patient: Annemarie Orr   YOB: 1960   Date of Visit: 2019       Dear Dr China Guardado: Thank you for referring Tee Slater to me for evaluation  Below are my notes for this consultation  If you have questions, please do not hesitate to call me  I look forward to following your patient along with you  Sincerely,        Mackenzie Driver  CC: No Recipients  Williams Driver   2019  2:05 PM  Sign at close encounter  HEARING EVALUATION    Name:  Annemarie Orr  :  1960  Age:  62 y o  Date of Evaluation: 19     History: exam required for employment  Reason for visit: Annemarie Orr is being seen today at the request of Dr China Guardado for an evaluation of hearing  Patient is a  and must provide a completed hearing test to her employer  The patient has hearing aids which she acquired at [facility], and states her left ear is worse than her right ear  EVALUATION:    Otoscopic Evaluation:   Right Ear: Minimum cerumen    Left Ear: Minimum cerumen     Tympanometry:   Right: Type A - normal middle ear pressure and compliance   Left: Type A - normal middle ear pressure and compliance    Audiogram Results:  Right ear: Moderately severe hearing loss  WRS is 92%  Left ear:  Severe hearing loss  WRS is 80%  Aided responses per [place]  form:    Aided Responses in the Soundfield:   500 Hz warble tone:  40dB             1,000 Hz warble tone:  40dB             2,000 Hz warble tone:  45dB    Aided Speech Recognition scores:   Left:  40dB   Right:  25dB    Aided Word Recognition scores (presentation level 50dB, 35dB speech noise presented to opposite ear):   Left:  80%   Right:  76%     *see attached audiogram      Speechmapping revealed left hearing aid does not meet NAL-NL2 targets above 2KHz in the left ear  Right ear low frequencies are above target       ANSI Pt aware standards were not available for this hearing aid  RECOMMENDATIONS:  Annual hearing eval and Copy to Patient/Caregiver    PATIENT EDUCATION:   Discussed results and recommendations with patient  Questions were addressed and the patient was encouraged to contact our department should concerns arise        Mackenzie Camp , CCC-A  Clinical Audiologist

## 2020-07-28 ENCOUNTER — TELEPHONE (OUTPATIENT)
Dept: FAMILY MEDICINE CLINIC | Facility: MEDICAL CENTER | Age: 60
End: 2020-07-28

## 2020-07-28 DIAGNOSIS — Z01.10 HEARING SCREEN WITHOUT ABNORMAL FINDINGS: Primary | ICD-10-CM

## 2020-07-29 ENCOUNTER — OFFICE VISIT (OUTPATIENT)
Dept: AUDIOLOGY | Age: 60
End: 2020-07-29
Payer: COMMERCIAL

## 2020-07-29 DIAGNOSIS — H90.3 SENSORY HEARING LOSS, BILATERAL: Primary | ICD-10-CM

## 2020-07-29 PROCEDURE — 92552 PURE TONE AUDIOMETRY AIR: CPT | Performed by: AUDIOLOGIST-HEARING AID FITTER

## 2020-07-29 PROCEDURE — 92567 TYMPANOMETRY: CPT | Performed by: AUDIOLOGIST-HEARING AID FITTER

## 2020-07-29 PROCEDURE — 92556 SPEECH AUDIOMETRY COMPLETE: CPT | Performed by: AUDIOLOGIST-HEARING AID FITTER

## 2020-07-29 NOTE — TELEPHONE ENCOUNTER
Referral generated  Patient's  did request this when he was here yesterday for his visit  He told me his wife already has an appointment and a referral needed to be placed in the chart

## 2020-08-26 ENCOUNTER — OFFICE VISIT (OUTPATIENT)
Dept: AUDIOLOGY | Age: 60
End: 2020-08-26
Payer: COMMERCIAL

## 2020-08-26 DIAGNOSIS — H90.3 SENSORY HEARING LOSS, BILATERAL: Primary | ICD-10-CM

## 2020-08-26 PROCEDURE — 92552 PURE TONE AUDIOMETRY AIR: CPT | Performed by: AUDIOLOGIST

## 2020-08-26 PROCEDURE — 92556 SPEECH AUDIOMETRY COMPLETE: CPT | Performed by: AUDIOLOGIST

## 2020-08-26 NOTE — PROGRESS NOTES
Progress Note    Name:  Dionne Yarbrough  :  1960  Age:  56 y o  Date of Evaluation: 20     Patient came in again to have her [address] [place] [address] [place] completed again  Patient did not pass the requirements for aided testing  See attached forms  Gave patient [facility] information         Marian Falk , CCC-A  Clinical Audiologist

## 2021-01-08 ENCOUNTER — VBI (OUTPATIENT)
Dept: ADMINISTRATIVE | Facility: OTHER | Age: 61
End: 2021-01-08

## 2021-02-03 ENCOUNTER — TELEPHONE (OUTPATIENT)
Dept: FAMILY MEDICINE CLINIC | Facility: MEDICAL CENTER | Age: 61
End: 2021-02-03

## 2021-02-03 DIAGNOSIS — R05.9 COUGH: Primary | ICD-10-CM

## 2021-02-03 DIAGNOSIS — R05.9 COUGH: ICD-10-CM

## 2021-02-03 PROCEDURE — U0005 INFEC AGEN DETEC AMPLI PROBE: HCPCS | Performed by: FAMILY MEDICINE

## 2021-02-03 PROCEDURE — U0003 INFECTIOUS AGENT DETECTION BY NUCLEIC ACID (DNA OR RNA); SEVERE ACUTE RESPIRATORY SYNDROME CORONAVIRUS 2 (SARS-COV-2) (CORONAVIRUS DISEASE [COVID-19]), AMPLIFIED PROBE TECHNIQUE, MAKING USE OF HIGH THROUGHPUT TECHNOLOGIES AS DESCRIBED BY CMS-2020-01-R: HCPCS | Performed by: FAMILY MEDICINE

## 2021-02-03 NOTE — TELEPHONE ENCOUNTER
While I was on a video visit with patient's  he told me his wife had a cough and he believed she had COVID  She did have testing done at [facility] but that is still pending  I did speak with patient  She is coughing  She feels ill  No thermometer  Therefore unable to check her temperature  Does have cough  No shortness of breath or chest pain  I recommended a COVID test through [facility] and she was in agreement  She will go to [place] [id number] for swabbing  She will isolate in the meantime  She was instructed to go to the ER if shortness of breath or chest pain develops

## 2021-02-04 ENCOUNTER — TELEPHONE (OUTPATIENT)
Dept: FAMILY MEDICINE CLINIC | Facility: MEDICAL CENTER | Age: 61
End: 2021-02-04

## 2021-02-04 LAB — SARS-COV-2 RNA RESP QL NAA+PROBE: NEGATIVE

## 2021-03-10 DIAGNOSIS — Z23 ENCOUNTER FOR IMMUNIZATION: ICD-10-CM

## 2021-03-30 ENCOUNTER — VBI (OUTPATIENT)
Dept: ADMINISTRATIVE | Facility: OTHER | Age: 61
End: 2021-03-30

## 2021-04-29 ENCOUNTER — VBI (OUTPATIENT)
Dept: ADMINISTRATIVE | Facility: OTHER | Age: 61
End: 2021-04-29

## 2021-07-22 ENCOUNTER — OFFICE VISIT (OUTPATIENT)
Dept: PODIATRY | Facility: CLINIC | Age: 61
End: 2021-07-22
Payer: COMMERCIAL

## 2021-07-22 VITALS
WEIGHT: 187.8 LBS | HEART RATE: 97 BPM | DIASTOLIC BLOOD PRESSURE: 85 MMHG | BODY MASS INDEX: 30.18 KG/M2 | HEIGHT: 66 IN | SYSTOLIC BLOOD PRESSURE: 128 MMHG

## 2021-07-22 DIAGNOSIS — M21.619 BUNION: ICD-10-CM

## 2021-07-22 DIAGNOSIS — M20.11 HALLUX VALGUS OF RIGHT FOOT: Primary | ICD-10-CM

## 2021-07-22 DIAGNOSIS — M20.12 HALLUX VALGUS OF LEFT FOOT: ICD-10-CM

## 2021-07-22 PROCEDURE — 99243 OFF/OP CNSLTJ NEW/EST LOW 30: CPT | Performed by: PODIATRIST

## 2021-07-22 PROCEDURE — 3008F BODY MASS INDEX DOCD: CPT | Performed by: PODIATRIST

## 2021-07-22 NOTE — LETTER
July 29, 2021     Milo RenélelaDO  [address] [facility] [address]    Patient: Nina Denny   YOB: 1960   Date of Visit: 7/22/2021       Dear Dr Armando Neither: Thank you for referring Madalyn Phelps to me for evaluation  Below are my notes for this consultation  If you have questions, please do not hesitate to call me  I look forward to following your patient along with you  Sincerely,        Jono Schmitz DPM        CC: No Recipients  Jono Schmitz DPM  7/22/2021  6:00 PM  Signed                 PATIENT:  Nina Denny  1960       ASSESSMENT:     1  Hallux valgus of right foot  XR foot 3+ vw right   2  Hallux valgus of left foot  XR foot 3+ vw left   3  Bunion               PLAN:  1  Patient was counseled and educated on the condition and the diagnosis  2  The diagnosis, treatment options and prognosis were discussed with the patient  3  She has chronic bunion/ HAV and discussed options including non-surgical and surgical options  4  She feels conservative care does not help her any more and wishes to have surgery  5  Most likely she needs Darrell bunionectomy  Sent her for X-ray of both feet  Surgical details and post-op course were explained  6  Plan OR on September  Discussed smoking cessation  Subjective:       HPI  The patient presents with chief complaint of painful bunion, left worse than worse  She had it for 8-9 years and pain has been worse in the past year  Throbbing sensation around great toe joint, left worse than right  She has difficulty wearing closed shoes now  Pain also increases with walking and standing  She tried pads, medications, icing, and different shoes without relieving her pain  She also noticed some dorsal foot pain and lateral ankle pain lately  She related this to compensation  Denied any swelling  No associated numbness or paresthesia  No significant weakness           The following portions of the patient's history were reviewed and updated as appropriate: allergies, current medications, past family history, past medical history, past social history, past surgical history and problem list   All pertinent labs and images were reviewed  Past Medical History  Past Medical History:   Diagnosis Date    Anxiety and depression     Bowel disease     Chronic obstructive lung disease ([address] )     Hypercholesterolemia     Hyperlipidemia     IBS (irritable bowel syndrome)     Sleep disturbance     Urinary incontinence     Vocal cord polyp        Past Surgical History  Past Surgical History:   Procedure Laterality Date    BREAST CYST EXCISION Left 1989    Benign    CHOLECYSTECTOMY      RHINOPLASTY      US GUIDED THYROID BIOPSY  5/15/2019        Allergies:  Iodinated diagnostic agents and Amoxicillin-pot clavulanate    Medications:  Current Outpatient Medications   Medication Sig Dispense Refill    ascorbic acid (VITAMIN C) 500 mg tablet Take 1,000 mg by mouth daily      FLUoxetine (PROzac) 40 MG capsule Take 1 capsule (40 mg total) by mouth daily 90 capsule 1    Multiple Vitamins-Minerals (MULTIVITAMIN WITH MINERALS) tablet Take 1 tablet by mouth daily       No current facility-administered medications for this visit         Social History:  Social History     Socioeconomic History    Marital status: /Civil Union     Spouse name: None    Number of children: 2    Years of education: None    Highest education level: None   Occupational History    None   Tobacco Use    Smoking status: Current Every Day Smoker    Smokeless tobacco: Never Used   Vaping Use    Vaping Use: Never used   Substance and Sexual Activity    Alcohol use: No    Drug use: No    Sexual activity: None   Other Topics Concern    None   Social History Narrative    Alcohol use - As per [employer]    Being a social drinker - As per [employer]    Caffeine use    Coffee    Drinks caffeinated tea    Exercise habits    Partner had vasectomy Sexually active - As per [employer]     Social Determinants of Health     Financial Resource Strain:     Difficulty of Paying Living Expenses:    Food Insecurity:     Worried About Running Out of Food in the Last Year:     [address] in the Last Year:    Transportation Needs:     Lack of Transportation (Medical):  Lack of Transportation (Non-Medical):    Physical Activity:     Days of Exercise per Week:     Minutes of Exercise per Session:    Stress:     Feeling of Stress :    Social Connections:     Frequency of Communication with Friends and Family:     Frequency of Social Gatherings with Friends and Family:     Attends Mandaeism Services:     Active Member of Clubs or Organizations:     Attends Club or Organization Meetings:     Marital Status:    Intimate Partner Violence:     Fear of Current or Ex-Partner:     Emotionally Abused:     Physically Abused:     Sexually Abused:           Review of Systems   Constitutional: Negative for appetite change, chills and fever  HENT: Negative for sore throat  Respiratory: Negative for cough and shortness of breath  Cardiovascular: Negative for chest pain  Gastrointestinal: Negative for diarrhea, nausea and vomiting  Musculoskeletal: Negative for back pain  Skin: Negative for rash and wound  Allergic/Immunologic: Negative for immunocompromised state  Neurological: Negative for weakness and numbness  Hematological: Negative  Psychiatric/Behavioral: Negative for behavioral problems and confusion  Objective:      /85   Pulse 97   Ht 5' 6" (1 676 m) Comment: verbal  Wt 85 2 kg (187 lb 12 8 oz)   BMI 30 31 kg/m²          Physical Exam  Vitals reviewed  Constitutional:       General: She is not in acute distress  Appearance: She is not ill-appearing or toxic-appearing  HENT:      Head: Normocephalic and atraumatic  Eyes:      Extraocular Movements: Extraocular movements intact     Cardiovascular:      Rate and Rhythm: Normal rate and regular rhythm  Pulses:           Dorsalis pedis pulses are 1+ on the right side and 1+ on the left side  Posterior tibial pulses are 2+ on the right side and 2+ on the left side  Pulmonary:      Effort: Pulmonary effort is normal  No respiratory distress  Musculoskeletal:         General: Tenderness and deformity present  No signs of injury  Cervical back: Normal range of motion and neck supple  Right lower leg: No edema  Left lower leg: No edema  Right foot: No foot drop  Left foot: No foot drop  Comments: Bunion / HAV noted, left worse than right  Redness and pain over medial eminence of 1st met head, left worse than right  Decreased 1st MPJ ROM  No hypermobility of 1st ray  Skin:     General: Skin is warm  Capillary Refill: Capillary refill takes less than 2 seconds  Coloration: Skin is not cyanotic or mottled  Findings: No abscess, ecchymosis, erythema, rash or wound  Nails: There is no clubbing  Neurological:      General: No focal deficit present  Mental Status: She is alert and oriented to person, place, and time  Cranial Nerves: No cranial nerve deficit  Sensory: No sensory deficit  Motor: No weakness  Coordination: Coordination normal    Psychiatric:         Mood and Affect: Mood normal          Behavior: Behavior normal          Thought Content:  Thought content normal          Judgment: Judgment normal

## 2021-07-22 NOTE — PROGRESS NOTES
PATIENT:  Susan Foreman  1960       ASSESSMENT:     1  Hallux valgus of right foot  XR foot 3+ vw right   2  Hallux valgus of left foot  XR foot 3+ vw left   3  Bunion               PLAN:  1  Patient was counseled and educated on the condition and the diagnosis  2  The diagnosis, treatment options and prognosis were discussed with the patient  3  She has chronic bunion/ HAV and discussed options including non-surgical and surgical options  4  She feels conservative care does not help her any more and wishes to have surgery  5  Most likely she needs Darrell bunionectomy  Sent her for X-ray of both feet  Surgical details and post-op course were explained  6  Plan OR on September  Discussed smoking cessation  Subjective:       HPI  The patient presents with chief complaint of painful bunion, left worse than worse  She had it for 8-9 years and pain has been worse in the past year  Throbbing sensation around great toe joint, left worse than right  She has difficulty wearing closed shoes now  Pain also increases with walking and standing  She tried pads, medications, icing, and different shoes without relieving her pain  She also noticed some dorsal foot pain and lateral ankle pain lately  She related this to compensation  Denied any swelling  No associated numbness or paresthesia  No significant weakness  The following portions of the patient's history were reviewed and updated as appropriate: allergies, current medications, past family history, past medical history, past social history, past surgical history and problem list   All pertinent labs and images were reviewed        Past Medical History  Past Medical History:   Diagnosis Date    Anxiety and depression     Bowel disease     Chronic obstructive lung disease ([facility] [address] )     Hypercholesterolemia     Hyperlipidemia     IBS (irritable bowel syndrome)     Sleep disturbance     Urinary incontinence     Vocal cord polyp        Past Surgical History  Past Surgical History:   Procedure Laterality Date    BREAST CYST EXCISION Left 1989    Benign    CHOLECYSTECTOMY      RHINOPLASTY      US GUIDED THYROID BIOPSY  5/15/2019        Allergies:  Iodinated diagnostic agents and Amoxicillin-pot clavulanate    Medications:  Current Outpatient Medications   Medication Sig Dispense Refill    ascorbic acid (VITAMIN C) 500 mg tablet Take 1,000 mg by mouth daily      FLUoxetine (PROzac) 40 MG capsule Take 1 capsule (40 mg total) by mouth daily 90 capsule 1    Multiple Vitamins-Minerals (MULTIVITAMIN WITH MINERALS) tablet Take 1 tablet by mouth daily       No current facility-administered medications for this visit  Social History:  Social History     Socioeconomic History    Marital status: /Civil Union     Spouse name: None    Number of children: 2    Years of education: None    Highest education level: None   Occupational History    None   Tobacco Use    Smoking status: Current Every Day Smoker    Smokeless tobacco: Never Used   Vaping Use    Vaping Use: Never used   Substance and Sexual Activity    Alcohol use: No    Drug use: No    Sexual activity: None   Other Topics Concern    None   Social History Narrative    Alcohol use - As per [employer]    Being a social drinker - As per [employer]    Caffeine use    Coffee    Drinks caffeinated tea    Exercise habits    Partner had vasectomy    Sexually active - As per [employer]     Social Determinants of Health     Financial Resource Strain:     Difficulty of Paying Living Expenses:    Food Insecurity:     Worried About Running Out of Food in the Last Year:     Ran Out of Food in the Last Year:    Transportation Needs:     Lack of Transportation (Medical):      Lack of Transportation (Non-Medical):    Physical Activity:     Days of Exercise per Week:     Minutes of Exercise per Session:    Stress:     Feeling of Stress :    Social Connections:  Frequency of Communication with Friends and Family:     Frequency of Social Gatherings with Friends and Family:     Attends Pentecostalism Services:     Active Member of Clubs or Organizations:     Attends Club or Organization Meetings:     Marital Status:    Intimate Partner Violence:     Fear of Current or Ex-Partner:     Emotionally Abused:     Physically Abused:     Sexually Abused:           Review of Systems   Constitutional: Negative for appetite change, chills and fever  HENT: Negative for sore throat  Respiratory: Negative for cough and shortness of breath  Cardiovascular: Negative for chest pain  Gastrointestinal: Negative for diarrhea, nausea and vomiting  Musculoskeletal: Negative for back pain  Skin: Negative for rash and wound  Allergic/Immunologic: Negative for immunocompromised state  Neurological: Negative for weakness and numbness  Hematological: Negative  Psychiatric/Behavioral: Negative for behavioral problems and confusion  Objective:      /85   Pulse 97   Ht 5' 6" (1 676 m) Comment: verbal  Wt 85 2 kg (187 lb 12 8 oz)   BMI 30 31 kg/m²          Physical Exam  Vitals reviewed  Constitutional:       General: She is not in acute distress  Appearance: She is not ill-appearing or toxic-appearing  HENT:      Head: Normocephalic and atraumatic  Eyes:      Extraocular Movements: Extraocular movements intact  Cardiovascular:      Rate and Rhythm: Normal rate and regular rhythm  Pulses:           Dorsalis pedis pulses are 1+ on the right side and 1+ on the left side  Posterior tibial pulses are 2+ on the right side and 2+ on the left side  Pulmonary:      Effort: Pulmonary effort is normal  No respiratory distress  Musculoskeletal:         General: Tenderness and deformity present  No signs of injury  Cervical back: Normal range of motion and neck supple  Right lower leg: No edema  Left lower leg: No edema  Right foot: No foot drop  Left foot: No foot drop  Comments: Bunion / HAV noted, left worse than right  Redness and pain over medial eminence of 1st met head, left worse than right  Decreased 1st MPJ ROM  No hypermobility of 1st ray  Skin:     General: Skin is warm  Capillary Refill: Capillary refill takes less than 2 seconds  Coloration: Skin is not cyanotic or mottled  Findings: No abscess, ecchymosis, erythema, rash or wound  Nails: There is no clubbing  Neurological:      General: No focal deficit present  Mental Status: She is alert and oriented to person, place, and time  Cranial Nerves: No cranial nerve deficit  Sensory: No sensory deficit  Motor: No weakness  Coordination: Coordination normal    Psychiatric:         Mood and Affect: Mood normal          Behavior: Behavior normal          Thought Content:  Thought content normal          Judgment: Judgment normal

## 2021-07-29 ENCOUNTER — VBI (OUTPATIENT)
Dept: ADMINISTRATIVE | Facility: OTHER | Age: 61
End: 2021-07-29

## 2021-08-20 ENCOUNTER — RA CDI HCC (OUTPATIENT)
Dept: OTHER | Facility: HOSPITAL | Age: 61
End: 2021-08-20

## 2021-08-20 NOTE — PROGRESS NOTES
[place] [id number]  coding opportunities             Chart Reviewed * (Number of) Inbasket suggestions sent to Provider: 1     Problem listed updated  Provider Accepted, (number of) suggestions accepted: 1         Number of suggestions used: 1      Number of suggestions NOT actually used: 0     Patients insurance company: [employer] (Medicare Advantage and Commercial)     Visit status: Patient arrived for their scheduled appointment        [place] [address]  coding opportunities             Chart Reviewed * (Number of) Inbasket suggestions sent to Provider: 1     Problem listed updated   Provider Accepted, (number of) suggestions accepted: 1               Patients insurance company: [employer] (Medicare Advantage and Commercial)           [place] [address]  coding opportunities             Chart Reviewed * (Number of) Inbasket suggestions sent to Provider: 1                  Patients insurance company: [employer] (Medicare Advantage and [employer])           Refer to office visit dated 10/15/19  F32 2 Current severe episode of major depressive disorder without 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 episode ([place] [id number] )

## 2021-08-23 PROBLEM — F32.2 CURRENT SEVERE EPISODE OF MAJOR DEPRESSIVE DISORDER WITHOUT PSYCHOTIC FEATURES WITHOUT PRIOR EPISODE (HCC): Status: ACTIVE | Noted: 2019-09-03

## 2021-08-27 ENCOUNTER — OFFICE VISIT (OUTPATIENT)
Dept: FAMILY MEDICINE CLINIC | Facility: MEDICAL CENTER | Age: 61
End: 2021-08-27
Payer: COMMERCIAL

## 2021-08-27 VITALS
OXYGEN SATURATION: 98 % | WEIGHT: 188 LBS | HEART RATE: 88 BPM | SYSTOLIC BLOOD PRESSURE: 158 MMHG | HEIGHT: 66 IN | TEMPERATURE: 97.9 F | DIASTOLIC BLOOD PRESSURE: 94 MMHG | BODY MASS INDEX: 30.22 KG/M2

## 2021-08-27 DIAGNOSIS — D75.1 POLYCYTHEMIA: ICD-10-CM

## 2021-08-27 DIAGNOSIS — F32.2 CURRENT SEVERE EPISODE OF MAJOR DEPRESSIVE DISORDER WITHOUT PSYCHOTIC FEATURES WITHOUT PRIOR EPISODE (HCC): ICD-10-CM

## 2021-08-27 DIAGNOSIS — Z23 IMMUNIZATION DUE: ICD-10-CM

## 2021-08-27 DIAGNOSIS — Z88.9 DRUG ALLERGY: ICD-10-CM

## 2021-08-27 DIAGNOSIS — Z01.818 PREOP EXAMINATION: Primary | ICD-10-CM

## 2021-08-27 DIAGNOSIS — F41.9 ANXIETY: ICD-10-CM

## 2021-08-27 DIAGNOSIS — Z00.00 PHYSICAL EXAM, ANNUAL: ICD-10-CM

## 2021-08-27 DIAGNOSIS — Z72.0 TOBACCO USE: ICD-10-CM

## 2021-08-27 DIAGNOSIS — R03.0 ELEVATED BP WITHOUT DIAGNOSIS OF HYPERTENSION: ICD-10-CM

## 2021-08-27 DIAGNOSIS — Z12.31 VISIT FOR SCREENING MAMMOGRAM: ICD-10-CM

## 2021-08-27 DIAGNOSIS — M21.612 BUNION, LEFT: ICD-10-CM

## 2021-08-27 DIAGNOSIS — E78.2 MIXED HYPERLIPIDEMIA: ICD-10-CM

## 2021-08-27 PROCEDURE — 90715 TDAP VACCINE 7 YRS/> IM: CPT | Performed by: FAMILY MEDICINE

## 2021-08-27 PROCEDURE — 90732 PPSV23 VACC 2 YRS+ SUBQ/IM: CPT | Performed by: FAMILY MEDICINE

## 2021-08-27 PROCEDURE — 99396 PREV VISIT EST AGE 40-64: CPT | Performed by: FAMILY MEDICINE

## 2021-08-27 PROCEDURE — 90472 IMMUNIZATION ADMIN EACH ADD: CPT | Performed by: FAMILY MEDICINE

## 2021-08-27 PROCEDURE — 90471 IMMUNIZATION ADMIN: CPT | Performed by: FAMILY MEDICINE

## 2021-08-27 PROCEDURE — 3725F SCREEN DEPRESSION PERFORMED: CPT | Performed by: FAMILY MEDICINE

## 2021-08-27 PROCEDURE — 99243 OFF/OP CNSLTJ NEW/EST LOW 30: CPT | Performed by: FAMILY MEDICINE

## 2021-08-27 NOTE — PROGRESS NOTES
Assessment/Plan:       Diagnoses and all orders for this visit:    Physical exam, annual  -     Lipid panel; Future  -     LDL cholesterol, direct; Future  -     Comprehensive metabolic panel; Future  -     Hemoglobin A1C; Future  -     TSH, 3rd generation; Future  -     T4, free; Future  -     CBC and differential; Future  -     Iron Panel (Includes Ferritin, Iron Sat%, Iron, and TIBC); Future  58-year-old female presenting for a physical exam     Patient encouraged to schedule an appoint with her gynecologist     Check labs  BMI Counseling: Body mass index is 30 34 kg/m²  The BMI is above normal  Nutrition recommendations include decreasing overall calorie intake  Exercise recommendations include moderate aerobic physical activity for 150 minutes/week  Tobacco Cessation Counseling: Tobacco cessation counseling and education was provided  The patient is sincerely urged to quit consumption of tobacco  She is not ready to quit tobacco  The numerous health risks of tobacco consumption were discussed  If she decides to quit, there are a number of helpful adjunctive aids, and she can see me to discuss nicotine replacement therapy, chantix, or bupropion anytime in the future  Visit for screening mammogram  -     Mammo screening bilateral w 3d & cad; Future  Order provided for overdue mammogram       Immunization due  -     PNEUMOCOCCAL POLYSACCHARIDE VACCINE 23-VALENT =>3YO SQ IM  -     TDAP VACCINE GREATER THAN OR EQUAL TO 6YO IM  Age-appropriate vaccinations recommended including pneumococcal vaccine, Tdap vaccine and shingles vaccine  Patient did agree to the pneumococcal vaccine and the Tdap vaccine  Vaccines given and tolerated well  Patient will receive her shingles vaccine at her follow-up appointment  Follow-up in one month or sooner if needed  Subjective:      Patient ID: Mendoza  is a 59 y o  female      Patient presents for a physical exam     She tells me she tries to eat healthy but does not exercise regularly  Current tobacco use  No alcohol use  No drug use  Has not seen her gynecologist in a few years  Overdue for mammography  Colonoscopy is up-to-date  Received COVID vaccination  Agreeable to labs  The following portions of the patient's history were reviewed and updated as appropriate: She  has a past medical history of Anxiety and depression, Bowel disease, Chronic obstructive lung disease ([facility] [address] ), Hypercholesterolemia, Hyperlipidemia, IBS (irritable bowel syndrome), Sleep disturbance, Urinary incontinence, and Vocal cord polyp  She   Patient Active Problem List    Diagnosis Date Noted    Georgette left 08/27/2021    Elevated BP without diagnosis of hypertension 08/27/2021    Tobacco use 08/27/2021    Current severe episode of major depressive disorder without 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 episode ([facility] [address] ) 09/03/2019    Anxiety 09/03/2019    Insomnia 09/03/2019    Breast pain 12/28/2018    Cervical arthritis 06/05/2017    Thyroid nodule 06/05/2017    Abnormal ultrasound of neck 05/25/2017    Polycythemia 05/15/2017    Hyperlipidemia 05/15/2017    Acid reflux 05/12/2017    Neck mass 05/12/2017    Snoring 05/12/2017     She  has a past surgical history that includes Cholecystectomy; Breast cyst excision (Left, 1989); Rhinoplasty; and US guided thyroid biopsy (5/15/2019)  Her family history includes Breast cancer (age of onset: 39) in her maternal aunt; Breast cancer (age of onset: 80) in her maternal grandmother; Diabetes in her sister; Hypertension in her father and mother; Skin cancer in her father; Thyroid cancer in her maternal aunt; Thyroid disease in her sister; Varicose Veins in her mother  She  reports that she has been smoking  She has never used smokeless tobacco  She reports that she does not drink alcohol and does not use drugs    Current Outpatient Medications   Medication Sig Dispense Refill    ascorbic acid (VITAMIN C) 500 mg tablet Take 1,000 mg by mouth daily      Multiple Vitamins-Minerals (MULTIVITAMIN WITH MINERALS) tablet Take 1 tablet by mouth daily       No current facility-administered medications for this visit  Current Outpatient Medications on File Prior to Visit   Medication Sig    ascorbic acid (VITAMIN C) 500 mg tablet Take 1,000 mg by mouth daily    Multiple Vitamins-Minerals (MULTIVITAMIN WITH MINERALS) tablet Take 1 tablet by mouth daily    [DISCONTINUED] FLUoxetine (PROzac) 40 MG capsule Take 1 capsule (40 mg total) by mouth daily (Patient not taking: Reported on 8/27/2021)     No current facility-administered medications on file prior to visit  She is allergic to iodinated diagnostic agents and amoxicillin-pot clavulanate       Review of Systems   Constitutional: Negative for fever  Respiratory: Negative for shortness of breath  Cardiovascular: Negative for chest pain  Gastrointestinal: Negative for abdominal pain and blood in stool  Genitourinary: Negative for dysuria and hematuria  Musculoskeletal: Negative for arthralgias and myalgias  Skin: Negative for rash  Neurological: Negative for headaches  Objective:      /94 (BP Location: Left arm, Patient Position: Sitting, Cuff Size: Large)   Pulse 88   Temp 97 9 °F (36 6 °C)   Ht 5' 6" (1 676 m)   Wt 85 3 kg (188 lb)   SpO2 98%   BMI 30 34 kg/m²          Physical Exam  Constitutional:       General: She is not in acute distress  Appearance: She is obese  She is not ill-appearing  Comments: No nose, mouth or throat complaints  Nose, mouth and throat not examined  Mask in place  COVID-19 pandemic  HENT:      Head: Normocephalic and atraumatic        Right Ear: Tympanic membrane, ear canal and external ear normal       Left Ear: Tympanic membrane, ear canal and external ear normal    Eyes:      General: Lids are normal       Conjunctiva/sclera: Conjunctivae normal       Pupils: Pupils are equal, round, and reactive to light  Neck:      Trachea: Trachea normal    Cardiovascular:      Rate and Rhythm: Normal rate and regular rhythm  Heart sounds: S1 normal and S2 normal  No murmur heard  Pulmonary:      Effort: Pulmonary effort is normal       Breath sounds: Normal breath sounds  Abdominal:      General: Bowel sounds are normal       Palpations: Abdomen is soft  Tenderness: There is no abdominal tenderness  Musculoskeletal:         General: Normal range of motion  Skin:     General: Skin is warm and dry  Neurological:      Mental Status: She is alert and oriented to person, place, and time  Psychiatric:         Speech: Speech normal          Behavior: Behavior normal          Thought Content:  Thought content normal

## 2021-08-27 NOTE — H&P (VIEW-ONLY)
Assessment/Plan:       Diagnoses and all orders for this visit:    Preop examination  Preop testing not previously ordered  Bunion, left  Scheduled for bunionectomy on 9/10/2021  Mixed hyperlipidemia  -     Lipid panel; Future  -     LDL cholesterol, direct; Future  -     Comprehensive metabolic panel; Future  Previous hyperlipidemia and also with elevated ASCVD risk  Patient is at elevated risk for MI and CVA  Statin therapy recommended  Patient declined  She wishes to repeat her labs to check her lipid levels currently  Polycythemia  -     CBC and differential; Future  -     Iron Panel (Includes Ferritin, Iron Sat%, Iron, and TIBC); Future  Polycythemia likely secondary to tobacco use  Check labs to monitor  Current severe episode of major depressive disorder without 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 episode ([facility] [address] )  Depression persists  Patient refuses treatment  No suicidal ideation  Monitor  Anxiety  Anxiety persists  Patient refuses treatment  Monitor  Elevated BP without diagnosis of hypertension  -     Lipid panel; Future  -     LDL cholesterol, direct; Future  -     Comprehensive metabolic panel; Future  -     Hemoglobin A1C; Future  -     TSH, 3rd generation; Future  -     T4, free; Future  -     UA (URINE) with reflex to Scope  Blood pressure is elevated  I suspect this is a combination of obesity and tobacco use  I am not going to start any medication at this time  Check labs  Drug allergy  Please note multiple drug allergies  Tobacco use  Patient highly encouraged to stop smoking  Patient is not medically optimized for proposed surgery  Follow-up in one month or sooner if needed  Subjective:      Patient ID: Erin Vargas is a 59 y o  female  Patient presents for preoperative evaluation  She is scheduled to have left bunionectomy surgery with Dr Lilian Mallory on 9/10/2021      Patient has had surgery in the past with no difficulty with anesthesia  Patient has no history of MI or CVA  Patient has no shortness of breath or chest pain with exertion such as walking four blocks or walking up two flights of stairs  Patient has hyperlipidemia  Not on any medication at this time  Does not desire to start any medication untill labs are repeated  Patient has polycythemia  Not receiving any treatment at this time  Patient has elevated blood pressure  Blood pressure elevated in the office today  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 as well  Patient has depression  Not currently on any medication for depression  No suicidal ideation  Does not desire treatment for depression  Patient has anxiety  Not currently on any medication for anxiety  Does not desire treatment for anxiety  Patient has drug allergies  Patient continues to smoke  No acute concerns  The following portions of the patient's history were reviewed and updated as appropriate: She  has a past medical history of Anxiety and depression, Bowel disease, Chronic obstructive lung disease ([address] ), Hypercholesterolemia, Hyperlipidemia, IBS (irritable bowel syndrome), Sleep disturbance, Urinary incontinence, and Vocal cord polyp    She   Patient Active Problem List    Diagnosis Date Noted    Bunion, left 08/27/2021    Elevated BP without diagnosis of hypertension 08/27/2021    Tobacco use 08/27/2021    Current severe episode of major depressive disorder without 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 episode ([facility] [address] ) 09/03/2019    Anxiety 09/03/2019    Insomnia 09/03/2019    Breast pain 12/28/2018    Cervical arthritis 06/05/2017    Thyroid nodule 06/05/2017    Abnormal ultrasound of neck 05/25/2017    Polycythemia 05/15/2017    Hyperlipidemia 05/15/2017    Acid reflux 05/12/2017    Neck mass 05/12/2017    Snoring 05/12/2017     She  has a past surgical history that includes Cholecystectomy; Breast cyst excision (Left, 1989); Rhinoplasty; and US guided thyroid biopsy (5/15/2019)  Her family history includes Breast cancer (age of onset: 39) in her maternal aunt; Breast cancer (age of onset: 80) in her maternal grandmother; Diabetes in her sister; Hypertension in her father and mother; Skin cancer in her father; Thyroid cancer in her maternal aunt; Thyroid disease in her sister; Varicose Veins in her mother  She  reports that she has been smoking  She has never used smokeless tobacco  She reports that she does not drink alcohol and does not use drugs  Current Outpatient Medications   Medication Sig Dispense Refill    ascorbic acid (VITAMIN C) 500 mg tablet Take 1,000 mg by mouth daily      Multiple Vitamins-Minerals (MULTIVITAMIN WITH MINERALS) tablet Take 1 tablet by mouth daily       No current facility-administered medications for this visit  Current Outpatient Medications on File Prior to Visit   Medication Sig    ascorbic acid (VITAMIN C) 500 mg tablet Take 1,000 mg by mouth daily    Multiple Vitamins-Minerals (MULTIVITAMIN WITH MINERALS) tablet Take 1 tablet by mouth daily    [DISCONTINUED] FLUoxetine (PROzac) 40 MG capsule Take 1 capsule (40 mg total) by mouth daily (Patient not taking: Reported on 8/27/2021)     No current facility-administered medications on file prior to visit  She is allergic to iodinated diagnostic agents and amoxicillin-pot clavulanate       Review of Systems   Constitutional: Negative for fever  Respiratory: Negative for shortness of breath  Cardiovascular: Negative for chest pain  Gastrointestinal: Negative for abdominal pain and blood in stool  Genitourinary: Negative for dysuria and hematuria  Musculoskeletal: Negative for arthralgias and myalgias  Skin: Negative for rash  Neurological: Negative for headaches           Objective:      /94 (BP Location: Left arm, Patient Position: Sitting, Cuff Size: Large)   Pulse 88   Temp 97 9 °F (36 6 °C)   Ht 5' 6" (1 676 m)   Wt 85 3 kg (188 lb)   SpO2 98%   BMI 30 34 kg/m²          Physical Exam  Constitutional:       General: She is not in acute distress  Appearance: She is obese  She is not ill-appearing  Comments: No nose, mouth or throat complaints  Nose, mouth and throat not examined  Mask in place  COVID-19 pandemic  HENT:      Head: Normocephalic and atraumatic  Right Ear: Tympanic membrane, ear canal and external ear normal       Left Ear: Tympanic membrane, ear canal and external ear normal    Eyes:      General: Lids are normal       Conjunctiva/sclera: Conjunctivae normal       Pupils: Pupils are equal, round, and reactive to light  Neck:      Trachea: Trachea normal    Cardiovascular:      Rate and Rhythm: Normal rate and regular rhythm  Heart sounds: S1 normal and S2 normal  No murmur heard  Pulmonary:      Effort: Pulmonary effort is normal       Breath sounds: Normal breath sounds  Abdominal:      General: Bowel sounds are normal       Palpations: Abdomen is soft  Tenderness: There is no abdominal tenderness  Musculoskeletal:         General: Normal range of motion  Skin:     General: Skin is warm and dry  Neurological:      Mental Status: She is alert and oriented to person, place, and time  Psychiatric:         Speech: Speech normal          Behavior: Behavior normal          Thought Content:  Thought content normal

## 2021-08-28 ENCOUNTER — APPOINTMENT (OUTPATIENT)
Dept: LAB | Facility: MEDICAL CENTER | Age: 61
End: 2021-08-28
Payer: COMMERCIAL

## 2021-08-28 DIAGNOSIS — R03.0 ELEVATED BP WITHOUT DIAGNOSIS OF HYPERTENSION: ICD-10-CM

## 2021-08-28 DIAGNOSIS — Z00.00 PHYSICAL EXAM, ANNUAL: ICD-10-CM

## 2021-08-28 DIAGNOSIS — E78.2 MIXED HYPERLIPIDEMIA: ICD-10-CM

## 2021-08-28 DIAGNOSIS — D75.1 POLYCYTHEMIA: ICD-10-CM

## 2021-08-28 LAB
ALBUMIN SERPL BCP-MCNC: 3.6 G/DL (ref 3.5–5)
ALP SERPL-CCNC: 67 U/L (ref 46–116)
ALT SERPL W P-5'-P-CCNC: 37 U/L (ref 12–78)
ANION GAP SERPL CALCULATED.3IONS-SCNC: 3 MMOL/L (ref 4–13)
AST SERPL W P-5'-P-CCNC: 26 U/L (ref 5–45)
BASOPHILS # BLD AUTO: 0.06 THOUSANDS/ΜL (ref 0–0.1)
BASOPHILS NFR BLD AUTO: 1 % (ref 0–1)
BILIRUB SERPL-MCNC: 0.84 MG/DL (ref 0.2–1)
BILIRUB UR QL STRIP: NEGATIVE
BUN SERPL-MCNC: 9 MG/DL (ref 5–25)
CALCIUM SERPL-MCNC: 8.9 MG/DL (ref 8.3–10.1)
CHLORIDE SERPL-SCNC: 105 MMOL/L (ref 100–108)
CHOLEST SERPL-MCNC: 261 MG/DL (ref 50–200)
CLARITY UR: CLEAR
CO2 SERPL-SCNC: 28 MMOL/L (ref 21–32)
COLOR UR: YELLOW
CREAT SERPL-MCNC: 0.74 MG/DL (ref 0.6–1.3)
EOSINOPHIL # BLD AUTO: 0.14 THOUSAND/ΜL (ref 0–0.61)
EOSINOPHIL NFR BLD AUTO: 2 % (ref 0–6)
ERYTHROCYTE [DISTWIDTH] IN BLOOD BY AUTOMATED COUNT: 11.9 % (ref 11.6–15.1)
EST. AVERAGE GLUCOSE BLD GHB EST-MCNC: 111 MG/DL
FERRITIN SERPL-MCNC: 108 NG/ML (ref 8–388)
GFR SERPL CREATININE-BSD FRML MDRD: 86 ML/MIN/1.73SQ M
GLUCOSE P FAST SERPL-MCNC: 90 MG/DL (ref 65–99)
GLUCOSE UR STRIP-MCNC: NEGATIVE MG/DL
HBA1C MFR BLD: 5.5 %
HCT VFR BLD AUTO: 49.5 % (ref 34.8–46.1)
HDLC SERPL-MCNC: 51 MG/DL
HGB BLD-MCNC: 15.7 G/DL (ref 11.5–15.4)
HGB UR QL STRIP.AUTO: NEGATIVE
IMM GRANULOCYTES # BLD AUTO: 0.01 THOUSAND/UL (ref 0–0.2)
IMM GRANULOCYTES NFR BLD AUTO: 0 % (ref 0–2)
IRON SATN MFR SERPL: 24 %
IRON SERPL-MCNC: 76 UG/DL (ref 50–170)
KETONES UR STRIP-MCNC: NEGATIVE MG/DL
LDLC SERPL CALC-MCNC: 186 MG/DL (ref 0–100)
LDLC SERPL DIRECT ASSAY-MCNC: 179 MG/DL (ref 0–100)
LEUKOCYTE ESTERASE UR QL STRIP: NEGATIVE
LYMPHOCYTES # BLD AUTO: 1.85 THOUSANDS/ΜL (ref 0.6–4.47)
LYMPHOCYTES NFR BLD AUTO: 24 % (ref 14–44)
MCH RBC QN AUTO: 30.9 PG (ref 26.8–34.3)
MCHC RBC AUTO-ENTMCNC: 31.7 G/DL (ref 31.4–37.4)
MCV RBC AUTO: 97 FL (ref 82–98)
MONOCYTES # BLD AUTO: 0.88 THOUSAND/ΜL (ref 0.17–1.22)
MONOCYTES NFR BLD AUTO: 11 % (ref 4–12)
NEUTROPHILS # BLD AUTO: 4.77 THOUSANDS/ΜL (ref 1.85–7.62)
NEUTS SEG NFR BLD AUTO: 62 % (ref 43–75)
NITRITE UR QL STRIP: NEGATIVE
NONHDLC SERPL-MCNC: 210 MG/DL
NRBC BLD AUTO-RTO: 0 /100 WBCS
PH UR STRIP.AUTO: 6.5 [PH]
PLATELET # BLD AUTO: 238 THOUSANDS/UL (ref 149–390)
PMV BLD AUTO: 10.5 FL (ref 8.9–12.7)
POTASSIUM SERPL-SCNC: 4.8 MMOL/L (ref 3.5–5.3)
PROT SERPL-MCNC: 7.2 G/DL (ref 6.4–8.2)
PROT UR STRIP-MCNC: NEGATIVE MG/DL
RBC # BLD AUTO: 5.08 MILLION/UL (ref 3.81–5.12)
SODIUM SERPL-SCNC: 136 MMOL/L (ref 136–145)
SP GR UR STRIP.AUTO: 1.01 (ref 1–1.03)
T4 FREE SERPL-MCNC: 0.95 NG/DL (ref 0.76–1.46)
TIBC SERPL-MCNC: 317 UG/DL (ref 250–450)
TRIGL SERPL-MCNC: 121 MG/DL
TSH SERPL DL<=0.05 MIU/L-ACNC: 2.18 UIU/ML (ref 0.36–3.74)
UROBILINOGEN UR QL STRIP.AUTO: 0.2 E.U./DL
WBC # BLD AUTO: 7.71 THOUSAND/UL (ref 4.31–10.16)

## 2021-08-28 PROCEDURE — 82728 ASSAY OF FERRITIN: CPT

## 2021-08-28 PROCEDURE — 85025 COMPLETE CBC W/AUTO DIFF WBC: CPT

## 2021-08-28 PROCEDURE — 83721 ASSAY OF BLOOD LIPOPROTEIN: CPT

## 2021-08-28 PROCEDURE — 83036 HEMOGLOBIN GLYCOSYLATED A1C: CPT

## 2021-08-28 PROCEDURE — 80053 COMPREHEN METABOLIC PANEL: CPT

## 2021-08-28 PROCEDURE — 83540 ASSAY OF IRON: CPT

## 2021-08-28 PROCEDURE — 81003 URINALYSIS AUTO W/O SCOPE: CPT | Performed by: FAMILY MEDICINE

## 2021-08-28 PROCEDURE — 84443 ASSAY THYROID STIM HORMONE: CPT

## 2021-08-28 PROCEDURE — 84439 ASSAY OF FREE THYROXINE: CPT

## 2021-08-28 PROCEDURE — 36415 COLL VENOUS BLD VENIPUNCTURE: CPT

## 2021-08-28 PROCEDURE — 83550 IRON BINDING TEST: CPT

## 2021-08-28 PROCEDURE — 80061 LIPID PANEL: CPT

## 2021-08-31 ENCOUNTER — TELEPHONE (OUTPATIENT)
Dept: FAMILY MEDICINE CLINIC | Facility: MEDICAL CENTER | Age: 61
End: 2021-08-31

## 2021-08-31 ENCOUNTER — OFFICE VISIT (OUTPATIENT)
Dept: PODIATRY | Facility: CLINIC | Age: 61
End: 2021-08-31
Payer: COMMERCIAL

## 2021-08-31 VITALS
HEART RATE: 87 BPM | DIASTOLIC BLOOD PRESSURE: 89 MMHG | SYSTOLIC BLOOD PRESSURE: 144 MMHG | WEIGHT: 186.2 LBS | HEIGHT: 66 IN | BODY MASS INDEX: 29.92 KG/M2

## 2021-08-31 DIAGNOSIS — M20.12 HALLUX VALGUS OF LEFT FOOT: ICD-10-CM

## 2021-08-31 DIAGNOSIS — M20.62 ACQUIRED DEFORMITIES OF TOE, LEFT: ICD-10-CM

## 2021-08-31 DIAGNOSIS — M20.11 HALLUX VALGUS OF RIGHT FOOT: Primary | ICD-10-CM

## 2021-08-31 PROCEDURE — 4004F PT TOBACCO SCREEN RCVD TLK: CPT | Performed by: PODIATRIST

## 2021-08-31 PROCEDURE — 3008F BODY MASS INDEX DOCD: CPT | Performed by: PODIATRIST

## 2021-08-31 PROCEDURE — 99214 OFFICE O/P EST MOD 30 MIN: CPT | Performed by: PODIATRIST

## 2021-08-31 RX ORDER — CLINDAMYCIN PHOSPHATE 900 MG/50ML
900 INJECTION INTRAVENOUS ONCE
Status: CANCELLED | OUTPATIENT
Start: 2021-09-10

## 2021-08-31 RX ORDER — CHLORHEXIDINE GLUCONATE 0.12 MG/ML
15 RINSE ORAL ONCE
Status: CANCELLED | OUTPATIENT
Start: 2021-09-10

## 2021-08-31 RX ORDER — CHLORHEXIDINE GLUCONATE 4 G/100ML
SOLUTION TOPICAL DAILY PRN
Status: CANCELLED | OUTPATIENT
Start: 2021-09-10

## 2021-08-31 NOTE — TELEPHONE ENCOUNTER
Asuncion Benedict from [employer] is calling to find out why the pt is not cleared for surgery  The pt was not sure why  Please, advise and we will call Kaley back  Thank you

## 2021-08-31 NOTE — PROGRESS NOTES
PATIENT:  Selvin Drake  1960       ASSESSMENT:     1  Hallux valgus of right foot     2  Hallux valgus of left foot  Case request operating room: BUNIONECTOMY JAIDA LEFT FOOT (cpt 17234), OSTEOTOMY OF PROXIMAL PHALAX LEFT GREAT TOE (cpt 53337)    Case request operating room: BUNIONECTOMY JAIDA LEFT FOOT (cpt 30693), OSTEOTOMY OF PROXIMAL PHALAX LEFT GREAT TOE (cpt 44640)   3  Acquired deformities of toe, left  Case request operating room: BUNIONECTOMY JAIDA LEFT FOOT (cpt 24884), OSTEOTOMY OF PROXIMAL PHALAX LEFT GREAT TOE (cpt 15952)    Case request operating room: BUNIONECTOMY JAIDA LEFT FOOT (cpt 98460), OSTEOTOMY OF PROXIMAL PHALAX LEFT GREAT TOE (cpt 63470)           PLAN:  1  Patient was counseled and educated on the condition and the diagnosis  2  X-ray was obtained and personally reviewed  It was discussed with her  3  The diagnosis, treatment options and prognosis were discussed with the patient  4  She feels conservative care does not help her any more and wishes to have surgery  5  Explained surgical details and post-op course  Discussed all risks and complications related to the patient's condition and surgery  The benefits of surgery were also discussed  The patient understood that the surgery would not guarantee desirable outcome  All questions and concerns were addressed and the consent was signed  Will proceed with Chase Garcia with shawn Penaloza  6  Reviewed blood works  Will check with her PCP for clearance  7  Discussed smoking cessation  Subjective:     HPI  The patient presents with chief complaint of painful bunion, left worse than worse  Throbbing sensation around great toe joint, left worse than right  She has difficulty wearing closed shoes  Pain increases with walking and standing  Conservative care does not help her at this point  Denied any swelling  No associated numbness or paresthesia  No significant weakness  The following portions of the patient's history were reviewed and updated as appropriate: allergies, current medications, past family history, past medical history, past social history, past surgical history and problem list   All pertinent labs and images were reviewed  Past Medical History  Past Medical History:   Diagnosis Date    Anxiety and depression     Bowel disease     Chronic obstructive lung disease ([address] )     Hypercholesterolemia     Hyperlipidemia     IBS (irritable bowel syndrome)     Sleep disturbance     Urinary incontinence     Vocal cord polyp        Past Surgical History  Past Surgical History:   Procedure Laterality Date    BREAST CYST EXCISION Left 1989    Benign    CHOLECYSTECTOMY      RHINOPLASTY      US GUIDED THYROID BIOPSY  5/15/2019        Allergies:  Iodinated diagnostic agents and Amoxicillin-pot clavulanate    Medications:  Current Outpatient Medications   Medication Sig Dispense Refill    ascorbic acid (VITAMIN C) 500 mg tablet Take 1,000 mg by mouth daily      Multiple Vitamins-Minerals (MULTIVITAMIN WITH MINERALS) tablet Take 1 tablet by mouth daily       No current facility-administered medications for this visit         Social History:  Social History     Socioeconomic History    Marital status: /Civil Union     Spouse name: None    Number of children: 2    Years of education: None    Highest education level: None   Occupational History    None   Tobacco Use    Smoking status: Current Every Day Smoker    Smokeless tobacco: Never Used   Vaping Use    Vaping Use: Never used   Substance and Sexual Activity    Alcohol use: No    Drug use: No    Sexual activity: None   Other Topics Concern    None   Social History Narrative    Alcohol use - As per [employer]    Being a social drinker - As per [employer]    Caffeine use    Coffee    Drinks caffeinated tea    Exercise habits    Partner had vasectomy    Sexually active - As per [employer] Social Determinants of Health     Financial Resource Strain:     Difficulty of Paying Living Expenses:    Food Insecurity:     Worried About Running Out of Food in the Last Year:     [address] in the Last Year:    Transportation Needs:     Lack of Transportation (Medical):  Lack of Transportation (Non-Medical):    Physical Activity:     Days of Exercise per Week:     Minutes of Exercise per Session:    Stress:     Feeling of Stress :    Social Connections:     Frequency of Communication with Friends and Family:     Frequency of Social Gatherings with Friends and Family:     Attends [employer] Services:     Active Member of Clubs or Organizations:     Attends Club or Organization Meetings:     Marital Status:    Intimate Partner Violence:     Fear of Current or Ex-Partner:     Emotionally Abused:     Physically Abused:     Sexually Abused:           Review of Systems   Constitutional: Negative for appetite change, chills and fever  Respiratory: Negative for cough and shortness of breath  Cardiovascular: Negative for chest pain  Gastrointestinal: Negative for diarrhea, nausea and vomiting  Allergic/Immunologic: Negative for immunocompromised state  Neurological: Negative for numbness  Hematological: Negative  Objective:      /89   Pulse 87   Ht 5' 6" (1 676 m) Comment: verbal  Wt 84 5 kg (186 lb 3 2 oz)   BMI 30 05 kg/m²          Physical Exam  Vitals reviewed  Constitutional:       General: She is not in acute distress  Appearance: She is not ill-appearing or toxic-appearing  Cardiovascular:      Rate and Rhythm: Normal rate and regular rhythm  Pulses:           Dorsalis pedis pulses are 1+ on the right side and 1+ on the left side  Posterior tibial pulses are 2+ on the right side and 2+ on the left side  Pulmonary:      Effort: Pulmonary effort is normal  No respiratory distress     Musculoskeletal:         General: Tenderness and deformity present  No signs of injury  Right lower leg: No edema  Left lower leg: No edema  Right foot: No foot drop  Left foot: No foot drop  Comments: Bunion / HAV noted, left worse than right  Redness and pain over medial eminence of 1st met head, left worse than right  Decreased 1st MPJ ROM  No hypermobility of 1st ray  Skin:     General: Skin is warm  Capillary Refill: Capillary refill takes less than 2 seconds  Coloration: Skin is not cyanotic or mottled  Findings: No abscess, ecchymosis, erythema, rash or wound  Nails: There is no clubbing  Neurological:      General: No focal deficit present  Mental Status: She is alert and oriented to person, place, and time  Cranial Nerves: No cranial nerve deficit  Sensory: No sensory deficit  Motor: No weakness  Coordination: Coordination normal    Psychiatric:         Mood and Affect: Mood normal          Behavior: Behavior normal          Thought Content:  Thought content normal          Judgment: Judgment normal

## 2021-09-01 ENCOUNTER — TELEPHONE (OUTPATIENT)
Dept: PODIATRY | Facility: CLINIC | Age: 61
End: 2021-09-01

## 2021-09-01 NOTE — TELEPHONE ENCOUNTER
Call placed to patient and LM in regards to FMLA forms  I need a fax number to send the completed forms to  Will await call back

## 2021-09-02 NOTE — TELEPHONE ENCOUNTER
Patient with multiple medical problems including elevated blood pressure which may be uncontrolled hypertension as well as elevated lipids which are causing an elevated ASCVD risk  Patient has refuse treatment  This is the reason for stating patient is not medically optimized for proposed surgery  Patient can meet with the Surgical Anesthesia team prior to surgery to assess risk of proposed surgery  Ultimately however patient's surgeon can decide if patient will go through with surgery

## 2021-09-02 NOTE — TELEPHONE ENCOUNTER
Dr Edward Grullon - please review as per Dr Felix  Advise whether patient can go through with procedure      Thanks

## 2021-09-03 NOTE — TELEPHONE ENCOUNTER
We still did not get a response to this message-can it please be reviewed so the patient can be contacted?

## 2021-09-07 RX ORDER — ASPIRIN 81 MG/1
81 TABLET, CHEWABLE ORAL DAILY
COMMUNITY

## 2021-09-07 NOTE — PRE-PROCEDURE INSTRUCTIONS
Left message on voicemail for patient to call [facility] team.     Pre-Surgery Instructions:   Medication Instructions    ascorbic acid (VITAMIN C) 500 mg tablet Instructed patient per Anesthesia Guidelines   aspirin 81 mg chewable tablet Instructed patient per Anesthesia Guidelines   Multiple Vitamins-Minerals (MULTIVITAMIN WITH MINERALS) tablet Instructed patient per Anesthesia Guidelines  Patient has no medications to take morning of surgery  Instructed no NSAIDs, aspirins prior to surgery

## 2021-09-08 ENCOUNTER — ANESTHESIA EVENT (OUTPATIENT)
Dept: PERIOP | Facility: HOSPITAL | Age: 61
End: 2021-09-08
Payer: COMMERCIAL

## 2021-09-08 ENCOUNTER — TELEMEDICINE (OUTPATIENT)
Dept: ANESTHESIOLOGY | Facility: CLINIC | Age: 61
End: 2021-09-08
Payer: COMMERCIAL

## 2021-09-08 ENCOUNTER — VBI (OUTPATIENT)
Dept: ADMINISTRATIVE | Facility: OTHER | Age: 61
End: 2021-09-08

## 2021-09-08 VITALS — HEIGHT: 66 IN | WEIGHT: 186 LBS | BODY MASS INDEX: 29.89 KG/M2

## 2021-09-08 DIAGNOSIS — F17.210 CIGARETTE NICOTINE DEPENDENCE WITHOUT COMPLICATION: Primary | ICD-10-CM

## 2021-09-08 PROCEDURE — 99212 OFFICE O/P EST SF 10 MIN: CPT | Performed by: NURSE PRACTITIONER

## 2021-09-08 PROCEDURE — 4004F PT TOBACCO SCREEN RCVD TLK: CPT | Performed by: NURSE PRACTITIONER

## 2021-09-08 NOTE — PROGRESS NOTES
[facility]   Virtual Regular Visit    Verification of patient location:    Patient is located in the following state in which I hold an active license [place]      Assessment:  · 59year old female referred to [facility] for pre-surgery tobacco therapy   · Patient is scheduled on 9 10 21 for      BUNIONECTOMY JAIDA FOOT (Left Foot)       OSTEOTOMY OF PROXIMAL PHALAX GREAT TOE (Left Foot)   Anesthesia type: Choice     Plan:   · Currently patient is at work   · She wishes to enroll in [facility] smoking program after surgery, surgery is in 2 days and she has a lot going on  · Re-consult ambulatory smoking cessation program for after surgery   Problem List Items Addressed This Visit     None             Reason for visit is   Chief Complaint   Patient presents with    Surgical Optimization     smoking cessation visit    Nicotine Dependence        Encounter provider NABOR hTomas    Provider located at [address] [facility] [address] [phone]      Recent Visits  No visits were found meeting these conditions  Showing recent visits within past 7 days and meeting all other requirements  Today's Visits  Date Type Provider Dept   09/08/21 201 [facility], [address] [place] [address] [facility]   Showing today's visits and meeting all other requirements  Future Appointments  No visits were found meeting these conditions  Showing future appointments within next 150 days and meeting all other requirements       The patient was identified by name and date of birth  Cinda Sameer was informed that this is a telemedicine visit and that the visit is being conducted through [address] [place] [address] Now and patient was informed that this is a secure, HIPAA-compliant platform  She agrees to proceed     My office door was closed  No one else was in the room  She acknowledged consent and understanding of privacy and security of the video platform   The patient has agreed to participate and understands they can discontinue the visit at any time  Patient is aware this is a billable service  Subjective  Shamar Virk is a 59 y o  female who was referred to [facility] for tobacco therapy  Patient is having surgery in two days  I called patient today via video through [facility]  Patient was unable to connect  I called via telephone and 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, "Driving a school bus"  Patient is having surgery in 2 days and not sure of chnages that we can make in 2 days  She wishes to enroll in out tobacco program post-op  I placed the consult  HPI - UNABLE  PATIENT HAD TO HANG UP PHONE, SHE WAS DRIVING A SCHOOL BUS  Past Medical History:   Diagnosis Date    Anxiety     Anxiety and depression     Bowel disease     Chronic obstructive lung disease ([address] )     Depression     Hypercholesterolemia     Hyperlipidemia     IBS (irritable bowel syndrome)     Shortness of breath     Sleep disturbance     Urinary incontinence     Vocal cord polyp        Past Surgical History:   Procedure Laterality Date    BREAST CYST EXCISION Left 1989    Benign    CHOLECYSTECTOMY      CYST REMOVAL      on back    RHINOPLASTY      US GUIDED THYROID BIOPSY  5/15/2019       Current Outpatient Medications   Medication Sig Dispense Refill    ascorbic acid (VITAMIN C) 500 mg tablet Take 1,000 mg by mouth daily      aspirin 81 mg chewable tablet Chew 81 mg daily      Multiple Vitamins-Minerals (MULTIVITAMIN WITH MINERALS) tablet Take 1 tablet by mouth daily       No current facility-administered medications for this visit          Allergies   Allergen Reactions    Amoxicillin-Pot Clavulanate Rash    Iodinated Diagnostic Agents Hives and Itching       Review of Systems- UNABLE TO COMPLETE     Video Exam    Vitals:    09/08/21 0957   Weight: 84 4 kg (186 lb)   Height: 5' 6" (1 676 m)       Physical Exam   UNABLE TO COMPLETE     I spent 5 minutes directly with the patient during this visit    VIRTUAL VISIT [address] verbally agrees to participate in [employer]  Pt is aware that [employer] could be limited without vital signs or the ability to perform a full hands-on physical David James understands she or the provider may request at any time to terminate the video visit and request the patient to seek care or treatment in person

## 2021-09-10 ENCOUNTER — ANESTHESIA (OUTPATIENT)
Dept: PERIOP | Facility: HOSPITAL | Age: 61
End: 2021-09-10
Payer: COMMERCIAL

## 2021-09-10 ENCOUNTER — APPOINTMENT (OUTPATIENT)
Dept: RADIOLOGY | Facility: HOSPITAL | Age: 61
End: 2021-09-10
Payer: COMMERCIAL

## 2021-09-10 ENCOUNTER — HOSPITAL ENCOUNTER (OUTPATIENT)
Facility: HOSPITAL | Age: 61
Setting detail: OUTPATIENT SURGERY
Discharge: HOME | End: 2021-09-10
Attending: PODIATRIST | Admitting: PODIATRIST
Payer: COMMERCIAL

## 2021-09-10 VITALS
SYSTOLIC BLOOD PRESSURE: 137 MMHG | OXYGEN SATURATION: 95 % | BODY MASS INDEX: 29.89 KG/M2 | WEIGHT: 186 LBS | HEART RATE: 79 BPM | HEIGHT: 66 IN | RESPIRATION RATE: 16 BRPM | TEMPERATURE: 97.9 F | DIASTOLIC BLOOD PRESSURE: 74 MMHG

## 2021-09-10 DIAGNOSIS — T65.291A TOXIC EFFECT OF TOBACCO AND NICOTINE, ACCIDENTAL OR UNINTENTIONAL, INITIAL ENCOUNTER: Primary | ICD-10-CM

## 2021-09-10 DIAGNOSIS — G89.18 ACUTE POST-OPERATIVE PAIN: ICD-10-CM

## 2021-09-10 LAB
ATRIAL RATE: 67 BPM
ATRIAL RATE: 75 BPM
P AXIS: 72 DEGREES
P AXIS: 73 DEGREES
PR INTERVAL: 120 MS
PR INTERVAL: 128 MS
QRS AXIS: 22 DEGREES
QRS AXIS: 28 DEGREES
QRSD INTERVAL: 82 MS
QRSD INTERVAL: 94 MS
QT INTERVAL: 414 MS
QT INTERVAL: 414 MS
QTC INTERVAL: 437 MS
QTC INTERVAL: 462 MS
T WAVE AXIS: 44 DEGREES
T WAVE AXIS: 56 DEGREES
VENTRICULAR RATE: 67 BPM
VENTRICULAR RATE: 75 BPM

## 2021-09-10 PROCEDURE — 93005 ELECTROCARDIOGRAM TRACING: CPT

## 2021-09-10 PROCEDURE — C1713 ANCHOR/SCREW BN/BN,TIS/BN: HCPCS | Performed by: PODIATRIST

## 2021-09-10 PROCEDURE — C9290 INJ, BUPIVACAINE LIPOSOME: HCPCS | Performed by: PODIATRIST

## 2021-09-10 PROCEDURE — 93010 ELECTROCARDIOGRAM REPORT: CPT | Performed by: INTERNAL MEDICINE

## 2021-09-10 PROCEDURE — 28299 COR HLX VLGS DOUBLE OSTEOT: CPT | Performed by: PODIATRIST

## 2021-09-10 PROCEDURE — 73630 X-RAY EXAM OF FOOT: CPT

## 2021-09-10 PROCEDURE — 99024 POSTOP FOLLOW-UP VISIT: CPT | Performed by: PODIATRIST

## 2021-09-10 DEVICE — SPEED 13X10X10MM IMPLANT
Type: IMPLANTABLE DEVICE | Site: FOOT | Status: FUNCTIONAL
Brand: SPEED

## 2021-09-10 DEVICE — SCREW, HEADLESS 3.0X18MM_TI GREEN
Type: IMPLANTABLE DEVICE | Site: FOOT | Status: FUNCTIONAL
Brand: VILEX DUAL THREAD SCREW

## 2021-09-10 RX ORDER — CLINDAMYCIN PHOSPHATE 900 MG/50ML
INJECTION INTRAVENOUS AS NEEDED
Status: DISCONTINUED | OUTPATIENT
Start: 2021-09-10 | End: 2021-09-10

## 2021-09-10 RX ORDER — EPHEDRINE SULFATE 50 MG/ML
INJECTION INTRAVENOUS AS NEEDED
Status: DISCONTINUED | OUTPATIENT
Start: 2021-09-10 | End: 2021-09-10

## 2021-09-10 RX ORDER — SODIUM CHLORIDE, SODIUM LACTATE, POTASSIUM CHLORIDE, CALCIUM CHLORIDE 600; 310; 30; 20 MG/100ML; MG/100ML; MG/100ML; MG/100ML
INJECTION, SOLUTION INTRAVENOUS CONTINUOUS PRN
Status: DISCONTINUED | OUTPATIENT
Start: 2021-09-10 | End: 2021-09-10

## 2021-09-10 RX ORDER — MIDAZOLAM HYDROCHLORIDE 2 MG/2ML
INJECTION, SOLUTION INTRAMUSCULAR; INTRAVENOUS AS NEEDED
Status: DISCONTINUED | OUTPATIENT
Start: 2021-09-10 | End: 2021-09-10

## 2021-09-10 RX ORDER — ONDANSETRON 2 MG/ML
INJECTION INTRAMUSCULAR; INTRAVENOUS AS NEEDED
Status: DISCONTINUED | OUTPATIENT
Start: 2021-09-10 | End: 2021-09-10

## 2021-09-10 RX ORDER — LIDOCAINE HYDROCHLORIDE 20 MG/ML
INJECTION, SOLUTION EPIDURAL; INFILTRATION; INTRACAUDAL; PERINEURAL AS NEEDED
Status: DISCONTINUED | OUTPATIENT
Start: 2021-09-10 | End: 2021-09-10

## 2021-09-10 RX ORDER — CHLORHEXIDINE GLUCONATE 0.12 MG/ML
15 RINSE ORAL ONCE
Status: COMPLETED | OUTPATIENT
Start: 2021-09-10 | End: 2021-09-10

## 2021-09-10 RX ORDER — SODIUM CHLORIDE 9 MG/ML
INJECTION, SOLUTION INTRAVENOUS CONTINUOUS PRN
Status: DISCONTINUED | OUTPATIENT
Start: 2021-09-10 | End: 2021-09-10

## 2021-09-10 RX ORDER — MAGNESIUM HYDROXIDE 1200 MG/15ML
LIQUID ORAL AS NEEDED
Status: DISCONTINUED | OUTPATIENT
Start: 2021-09-10 | End: 2021-09-10 | Stop reason: HOSPADM

## 2021-09-10 RX ORDER — ONDANSETRON 2 MG/ML
4 INJECTION INTRAMUSCULAR; INTRAVENOUS ONCE AS NEEDED
Status: DISCONTINUED | OUTPATIENT
Start: 2021-09-10 | End: 2021-09-10 | Stop reason: HOSPADM

## 2021-09-10 RX ORDER — PROPOFOL 10 MG/ML
INJECTION, EMULSION INTRAVENOUS AS NEEDED
Status: DISCONTINUED | OUTPATIENT
Start: 2021-09-10 | End: 2021-09-10

## 2021-09-10 RX ORDER — FENTANYL CITRATE/PF 50 MCG/ML
25 SYRINGE (ML) INJECTION
Status: DISCONTINUED | OUTPATIENT
Start: 2021-09-10 | End: 2021-09-10 | Stop reason: HOSPADM

## 2021-09-10 RX ORDER — OXYCODONE HYDROCHLORIDE AND ACETAMINOPHEN 5; 325 MG/1; MG/1
1 TABLET ORAL ONCE AS NEEDED
Status: DISCONTINUED | OUTPATIENT
Start: 2021-09-10 | End: 2021-09-10 | Stop reason: HOSPADM

## 2021-09-10 RX ORDER — CHLORHEXIDINE GLUCONATE 4 G/100ML
SOLUTION TOPICAL DAILY PRN
Status: DISCONTINUED | OUTPATIENT
Start: 2021-09-10 | End: 2021-09-10 | Stop reason: HOSPADM

## 2021-09-10 RX ORDER — HYDROCODONE BITARTRATE AND ACETAMINOPHEN 5; 325 MG/1; MG/1
1 TABLET ORAL EVERY 6 HOURS PRN
Qty: 30 TABLET | Refills: 0 | Status: SHIPPED | OUTPATIENT
Start: 2021-09-10 | End: 2021-09-20

## 2021-09-10 RX ORDER — SODIUM CHLORIDE 9 MG/ML
125 INJECTION, SOLUTION INTRAVENOUS CONTINUOUS
Status: DISCONTINUED | OUTPATIENT
Start: 2021-09-10 | End: 2021-09-10 | Stop reason: HOSPADM

## 2021-09-10 RX ORDER — CLINDAMYCIN PHOSPHATE 900 MG/50ML
900 INJECTION INTRAVENOUS ONCE
Status: DISCONTINUED | OUTPATIENT
Start: 2021-09-10 | End: 2021-09-10 | Stop reason: HOSPADM

## 2021-09-10 RX ORDER — ACETAMINOPHEN 325 MG/1
650 TABLET ORAL ONCE AS NEEDED
Status: DISCONTINUED | OUTPATIENT
Start: 2021-09-10 | End: 2021-09-10 | Stop reason: HOSPADM

## 2021-09-10 RX ORDER — DEXAMETHASONE SODIUM PHOSPHATE 4 MG/ML
INJECTION, SOLUTION INTRA-ARTICULAR; INTRALESIONAL; INTRAMUSCULAR; INTRAVENOUS; SOFT TISSUE AS NEEDED
Status: DISCONTINUED | OUTPATIENT
Start: 2021-09-10 | End: 2021-09-10

## 2021-09-10 RX ORDER — FENTANYL CITRATE 50 UG/ML
INJECTION, SOLUTION INTRAMUSCULAR; INTRAVENOUS AS NEEDED
Status: DISCONTINUED | OUTPATIENT
Start: 2021-09-10 | End: 2021-09-10

## 2021-09-10 RX ADMIN — EPHEDRINE SULFATE 5 MG: 50 INJECTION, SOLUTION INTRAVENOUS at 12:21

## 2021-09-10 RX ADMIN — FENTANYL CITRATE 50 MCG: 50 INJECTION INTRAMUSCULAR; INTRAVENOUS at 12:20

## 2021-09-10 RX ADMIN — LIDOCAINE HYDROCHLORIDE 100 MG: 20 INJECTION, SOLUTION EPIDURAL; INFILTRATION; INTRACAUDAL; PERINEURAL at 12:07

## 2021-09-10 RX ADMIN — CLINDAMYCIN PHOSPHATE 900 MG: 900 INJECTION, SOLUTION INTRAVENOUS at 12:22

## 2021-09-10 RX ADMIN — CHLORHEXIDINE GLUCONATE 0.12% ORAL RINSE 15 ML: 1.2 LIQUID ORAL at 10:55

## 2021-09-10 RX ADMIN — PROPOFOL 30 MG: 10 INJECTION, EMULSION INTRAVENOUS at 12:15

## 2021-09-10 RX ADMIN — FENTANYL CITRATE 50 MCG: 50 INJECTION INTRAMUSCULAR; INTRAVENOUS at 12:07

## 2021-09-10 RX ADMIN — MIDAZOLAM 2 MG: 1 INJECTION INTRAMUSCULAR; INTRAVENOUS at 12:06

## 2021-09-10 RX ADMIN — DEXAMETHASONE SODIUM PHOSPHATE 4 MG: 4 INJECTION INTRA-ARTICULAR; INTRALESIONAL; INTRAMUSCULAR; INTRAVENOUS; SOFT TISSUE at 12:15

## 2021-09-10 RX ADMIN — EPHEDRINE SULFATE 5 MG: 50 INJECTION, SOLUTION INTRAVENOUS at 12:51

## 2021-09-10 RX ADMIN — SODIUM CHLORIDE, SODIUM LACTATE, POTASSIUM CHLORIDE, AND CALCIUM CHLORIDE: .6; .31; .03; .02 INJECTION, SOLUTION INTRAVENOUS at 13:07

## 2021-09-10 RX ADMIN — SODIUM CHLORIDE: 0.9 INJECTION, SOLUTION INTRAVENOUS at 12:06

## 2021-09-10 RX ADMIN — EPHEDRINE SULFATE 10 MG: 50 INJECTION, SOLUTION INTRAVENOUS at 12:29

## 2021-09-10 RX ADMIN — ONDANSETRON 4 MG: 2 INJECTION INTRAMUSCULAR; INTRAVENOUS at 13:05

## 2021-09-10 RX ADMIN — SODIUM CHLORIDE 125 ML/HR: 0.9 INJECTION, SOLUTION INTRAVENOUS at 10:55

## 2021-09-10 RX ADMIN — PROPOFOL 170 MG: 10 INJECTION, EMULSION INTRAVENOUS at 12:07

## 2021-09-10 NOTE — DISCHARGE SUMMARY
Discharge Summary Outpatient Procedure Podiatry -   Shira Joaquin 59 y o  female MRN: [id number]  Unit/Bed#: OR POOL Encounter: [id number]    Admission Date: 9/10/2021     Admitting Diagnosis: Hallux valgus of left foot [M20 12]  Acquired deformities of toe, left [M20 62]    Discharge Diagnosis: same    Procedures Performed: BUNIONECTOMY JAIDA FOOT: 61504 (CPT®)  OSTEOTOMY OF PROXIMAL PHALAX GREAT TOE:     Complications: none    Condition at Discharge: stable    Discharge instructions/Information to patient and family:   See after visit summary for information provided to patient and family  Provisions for Follow-Up Care/Important appointments:  See after visit summary for information related to follow-up care and any pertinent home health orders  Discharge Medications:  See after visit summary for reconciled discharge medications provided to patient and family

## 2021-09-10 NOTE — ANESTHESIA PREPROCEDURE EVALUATION
Procedure:  BUNIONECTOMY JAIDA FOOT (Left Foot)  OSTEOTOMY OF PROXIMAL PHALAX GREAT TOE (Left Foot)    Relevant Problems   CARDIO   (+) Breast pain   (+) Hyperlipidemia      GI/HEPATIC   (+) Acid reflux      MUSCULOSKELETAL   (+) Cervical arthritis      NEURO/PSYCH   (+) Anxiety   (+) Current severe episode of major depressive disorder without 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 episode [facility])        Physical Exam    Airway    Mallampati score: II  TM Distance: >3 FB  Neck ROM: full     Dental   No notable dental hx     Cardiovascular  Rhythm: regular, Rate: normal, Cardiovascular exam normal    Pulmonary  Pulmonary exam normal Breath sounds clear to auscultation,     Other Findings        Anesthesia Plan  ASA Score- 2     Anesthesia Type- general with ASA Monitors  Additional Monitors:   Airway Plan: LMA  Plan Factors-    Chart reviewed  Patient summary reviewed  Patient is a current smoker (1 5 ppd)  Patient instructed to abstain from smoking on day of procedure  Patient smoked on day of surgery  Obstructive sleep apnea risk education given perioperatively  Induction-     Postoperative Plan-     Informed Consent- Anesthetic plan and risks discussed with patient and spouse

## 2021-09-10 NOTE — INTERVAL H&P NOTE
H&P reviewed  After examining the patient I find no changes in the patients condition since the H&P had been written  Reviewed her home BP results  BP controlled today  Will proceed as scheduled  Advised smoking cessation       Vitals:    09/10/21 1105   BP: 145/76   Pulse: 62   Resp:    Temp:    SpO2:

## 2021-09-10 NOTE — OP NOTE
OPERATIVE REPORT - Podiatry  PATIENT NAME: Geetha Vidal    :  1960  MRN: [id number]  Pt Location: [facility] OR ROOM 03    SURGERY DATE: 9/10/2021    Surgeon(s) and Role:     * Edwin Victor DPM - Primary     * ARTHUR Castro - Assisting    Pre-op Diagnosis:  Hallux valgus of left foot [M20 12]  Acquired deformities of toe, left [M20 62]    Post-Op Diagnosis Codes:     * Hallux valgus of left foot [M20 12]     * Acquired deformities of toe, left [M20 62]    Procedure(s) (LRB):  BUNIONECTOMY JAIDA FOOT (Left)  OSTEOTOMY OF PROXIMAL PHALAX GREAT TOE (Left)    Specimen(s):  * No specimens in log *    Estimated Blood Loss:   Minimal    Drains:  * No LDAs found *    Anesthesia Type:   Choice with 20 ml of 1% Lidocaine and 0 5% Bupivacaine in a 1:1 mixture    Hemostasis:  Surgical dissection  Pneumatic ankle tourniquet placed for 74 minutes    Materials:  Implant Name Type Inv  Item Serial No   Lot No  LRB No  Used Action   SCREW 3 X 18MM VILEX - [id number]  SCREW 3 X 18MM VILEX  VILEX  Left 1 Implanted   IMPLANT SPEED 13 X 10 X 10MM - [id number]  IMPLANT SPEED 13 X 10 X 10MM  Synthes [id number] Left 1 Implanted     Vicryl, Monocryl  Operative Findings:  Hallux valgus deformity    Complications:   None    Procedure and Technique:     Under mild sedation, the patient was brought into the operating room and placed on the operating room table in the supine position  A pneumatic tourniquet was then placed around the patient's left lower extremity with ample webril padding  A time out was performed to confirm the correct patient, procedure and site with all parties in agreement  Following IV sedation, a local block was performed consisting of 20 ml of 1% Lidocaine and 0 5% Bupivacaine in a 1:1 mixture  The foot was then scrubbed, prepped and draped in the usual aseptic manner  An esmarch bandage was utilized to exsangunate the patients foot and the tourniquet was then inflated   The esmarch bandage was removed and the foot was placed on the operating room table  Attention was then directed to the dorsal aspect of the first metatarsophalangeal joint medial to the EHL tendon where an approximately 6 cm linear incision was made through skin and subcutaneous tissue  The incision was deepened through the subcutaneous tissues using sharp and blunt dissection  Care was taken to identify and retract all vital neural and vascular structures  All bleeders were cauterized and ligated as necessary  A capsuloptomy was performed over the dorsal aspect of the MPJ  The periosteal and capsular structures were then carefully dissected free of their osseous attachments and reflected medially and laterally, thus exposing the head of the first metatarsal at the operative site  Attention was then directed to the 1st interspace via the original skin incision where the dissection was continued deep using sharp dissection down to the conjoined tendon of the adductor halluces was then identified and transected at its attachment  At this time the lateral contraction presents on the hallux was noted to be reduced and the sesamoid apparatus was noted to float into a more corrected medial position  Attention was then directed to the first met head where the medial prominence was resected by the sagittal bone saw  A k-wire was used as a guidewire at the proximal-medial aspect of the 1st met head  A through and through V type osteotomy was made at a 60 degree angle  This cut was created in the metataphyseal region of the bone utilizing a sagittal bone saw and the apices of this osteotomy pointing proximal plantarly and proximal dorsally  Upon completion of this osteotomy, the capital fragment was distracted and shifted laterally into a more corrected position and impacted onto the shaft of the first met  2 K wires were used as temp fixation across the osteotomy site  With proper AO technique two 3 0x18mm micro compression screws by [employer] serves as fixation across the osteotomy site  Attention was directed to the remaining medial bone shelf proximal to the osteotomy site which was resected using a sagittal saw and passed from operative field  Correction of the deformity was assessed at this time and a Tremaine osteotomy was deemed necessary  Via the original skin incision the mid base of the proximal phalanx was further dissected out and periosteal structures were reflected medial laterally  A temporary K-wire was was placed in a dorsal to plantar fashion on the lateral cortex to serve as the apex of the osteotomy cut  The 1st medial to lateral cut was performed parallel to the base of the proximal phalanx through and through from dorsal to plantar with leaving the lateral cortex intact  A 2nd cut was made at bed approximately 15 degree angle distally from the original cut,  again in a through to through dorsal to plantar fashion leaving the lateral cortex intact  The osteotomy was closed with manual pressure and secured with one 13 x 10 x 10mm staple  At this time correction of deformity was noted to be excellent  The surgical incision was irrigated with copious amounts of normal sterile saline  The periosteal and capsular structures were reapproximated using 3-0 Vicryl  Deep fascia were reaproximated with 3-0 Vicryl  Subcutaneous closure was obtained utilizing 3-0 Vicryl in an interrupted suture technique  Skin edges were reapproximated and closure was obtained utilizing4-0  monocryl  The foot was then cleansed and dried  A postoperative injection consisting of 20 ml of Exparel was performed  Steri strips were then applied  The incision site was dressed with Xeroform, 4x4 gauze, and ABD  This was then covered with a Kerlix and an ACE wrap  The tourniquet was deflated and normal hyperemic flush was noted to all digits  The patient tolerated the procedure and anesthesia well and was transported to the PACU with vital signs stable  Dr Giovanni Arrieta was present during the entire procedure and participated in all key aspects  SIGNATURE: Mumtaz Dec, [place]  DATE: September 10, 2021  TIME: 2:54 PM      Portions of the record may have been created with voice recognition software  Occasional wrong word or "sound a like" substitutions may have occurred due to the inherent limitations of voice recognition software  Read the chart carefully and recognize, using context, where substitutions have occurred

## 2021-09-10 NOTE — DISCHARGE INSTRUCTIONS
Dr Shaina Hall Instructions    1  Take your prescribed medication as directed  2  Upon arrival at home, lie down and elevate your surgical foot on 2 pillows  3  Remain quiet, off your feet as much as possible, for the first 24-48 hours  This is when your feet first swell and may become painful  After 48 hours you may begin limited walking following these restrictions:   Partial weightbearing to heel of surgical foot weight to heel  Rest and elevate extremity as much as possible  4  Drink large quantities of water  Consume no alcohol  Continue a well-balanced diet  5  Report any unusual discomfort or fever to this office  6  A limited amount of discomfort and swelling is to be expected  In some cases the skin may take on a bruised appearance  The surgical solution that was applied to your foot prior to the operation is dark in color and the operation site may appear to be oozing when it actually is not  7  A slight amount of blood is to be expected, and is no cause for alarm  Do not remove the dressings  If there is active bleeding and if the bleeding persists, add additional gauze to the bandage, apply direct pressure, elevate your feet and call this office  8  Do not get the dressings wet  As regular bathing may be inconvenient, sponge baths are recommended  9  When anesthesia wears off and if any discomfort should be present, apply an ice pack directly over the operated area for 15 minute intervals for several hours or until the pain leaves  (USE IN EXCESS OF 15 MINUTES COULD CAUSE FROSTBITE)  Do not use hot water bags or electric pads  A convenient icepack can be made by placing ice cubes in a plastic bag and covering this with a towel  10  If necessary, take a mild laxative before retiring  11  Wear your special open shoes anytime you put weight on your foot, even if it is just to walk to the bathroom and back   It will probably be 2 or 3 weeks before you will be permitted to try regular rosina   12  Having performed the operation, we are interested in a prompt recovery  Please cooperate by following the above instructions    13  Please call to confirm your post-op appointment or call with any other questio

## 2021-09-17 ENCOUNTER — OFFICE VISIT (OUTPATIENT)
Dept: PODIATRY | Facility: CLINIC | Age: 61
End: 2021-09-17

## 2021-09-17 VITALS
DIASTOLIC BLOOD PRESSURE: 87 MMHG | HEIGHT: 66 IN | HEART RATE: 80 BPM | BODY MASS INDEX: 30.05 KG/M2 | WEIGHT: 187 LBS | SYSTOLIC BLOOD PRESSURE: 157 MMHG

## 2021-09-17 DIAGNOSIS — M20.62 ACQUIRED DEFORMITIES OF TOE, LEFT: ICD-10-CM

## 2021-09-17 DIAGNOSIS — M20.11 HALLUX VALGUS OF RIGHT FOOT: Primary | ICD-10-CM

## 2021-09-17 PROCEDURE — 99024 POSTOP FOLLOW-UP VISIT: CPT | Performed by: PODIATRIST

## 2021-09-17 NOTE — PROGRESS NOTES
PATIENT:  Annemarie Orr      1960    ASSESSMENT     1  Hallux valgus of right foot     2  Acquired deformities of toe, left            PLAN  Patient is doing well post-operatively  Sutures left intact  Incision was cleaned with betadine and DSD applied to be kept C/D/I  Continue post-op care as instructed  Stressed on patient compliance about proper off-loading, staying off of feet, and proper dressing care  Recommended her to use crutches or walker to take some weight off  Call if any increase in pain, fevers, calf pain, shortness of breath, or general distress is noted  Patient instructed to go to ER if call is not returned immediately  RA in 1 week  HISTORY OF PRESENT ILLNESS  Patient presents for post-op appointment  Post-op pain is under control and resolving well  The patient is feeling well and in good spirits  Patient reported no post-op concern  She presents with no crutches or walker  REVIEW OF SYSTEMS  GENERAL: No fever or chills  HEART: No chest pain, or palpitation  RESPIRATORY:  No SOB or cough  GI: No Nausea, vomit or diarrhea  NEUROLOGIC: No syncope or acute weakness      PHYSICAL EXAMINATION  GENERAL  The patient appears in NAD / non-toxic  Afebrile  VSS    VASCULAR EXAM  Pedal pulses and vascular status are intact  No calf pain or edema bilaterally  No cyanosis  DERMATOLOGIC EXAM  Incision is coapted and healing well  No signs of infection  No active drainage  Normal post-op edema and ecchymosis  No necrosis or dehiscence  NEUROLOGIC EXAM  AAO X 3  No focal neurologic deficit  Neurologic status is intact BLE  MUSCULOSKELETAL EXAM  Good surgical correction  Normal post-op findings  ROM intact  No fluctuation or crepitus

## 2021-09-28 ENCOUNTER — OFFICE VISIT (OUTPATIENT)
Dept: PODIATRY | Facility: CLINIC | Age: 61
End: 2021-09-28

## 2021-09-28 VITALS
BODY MASS INDEX: 30.22 KG/M2 | HEIGHT: 66 IN | SYSTOLIC BLOOD PRESSURE: 154 MMHG | WEIGHT: 188 LBS | DIASTOLIC BLOOD PRESSURE: 99 MMHG | HEART RATE: 80 BPM

## 2021-09-28 DIAGNOSIS — M20.12 HALLUX VALGUS OF LEFT FOOT: Primary | ICD-10-CM

## 2021-09-28 PROCEDURE — 3008F BODY MASS INDEX DOCD: CPT | Performed by: NURSE PRACTITIONER

## 2021-09-28 PROCEDURE — 99024 POSTOP FOLLOW-UP VISIT: CPT | Performed by: PODIATRIST

## 2021-09-28 RX ORDER — MULTIVIT WITH MINERALS/LUTEIN
1000 TABLET ORAL DAILY
COMMUNITY

## 2021-09-28 NOTE — PROGRESS NOTES
PATIENT:  Katerin Powell      1960    ASSESSMENT     1  Hallux valgus of left foot            PLAN  Patient is doing well post-operatively  Sutures removed  Instructed skin care and protection  Continue post-op care as instructed  Stressed on patient compliance about proper off-loading, staying off of feet  Call if any increase in pain, fevers, calf pain, shortness of breath, or general distress is noted  Patient instructed to go to ER if call is not returned immediately  RA in 3 weeks  I do not think she can return to work at this time  She would need another 6 weeks before she can wear regular shoes  HISTORY OF PRESENT ILLNESS  Patient presents for post-op appointment  Post-op pain is under control and resolving well  The patient is feeling well and in good spirits  She presents with no crutches or walker  She was not compliant about staying off of her feet  REVIEW OF SYSTEMS  GENERAL: No fever or chills  HEART: No chest pain, or palpitation  RESPIRATORY:  No SOB or cough  GI: No Nausea, vomit or diarrhea  NEUROLOGIC: No syncope or acute weakness      PHYSICAL EXAMINATION  GENERAL  The patient appears in NAD / non-toxic  Afebrile  VSS    VASCULAR EXAM  Pedal pulses and vascular status are intact  No calf pain or edema bilaterally  No cyanosis  DERMATOLOGIC EXAM  Incision is coapted and healed  No signs of infection  No drainage  Decreased post-op edema and ecchymosis  No necrosis or dehiscence  NEUROLOGIC EXAM  AAO X 3  No focal neurologic deficit  Neurologic status is intact BLE  MUSCULOSKELETAL EXAM  Good surgical correction  Normal post-op findings  ROM intact  No fluctuation or crepitus

## 2021-10-04 ENCOUNTER — OFFICE VISIT (OUTPATIENT)
Dept: FAMILY MEDICINE CLINIC | Facility: MEDICAL CENTER | Age: 61
End: 2021-10-04
Payer: COMMERCIAL

## 2021-10-04 VITALS
OXYGEN SATURATION: 97 % | HEIGHT: 66 IN | TEMPERATURE: 98.6 F | HEART RATE: 90 BPM | BODY MASS INDEX: 30.5 KG/M2 | DIASTOLIC BLOOD PRESSURE: 84 MMHG | SYSTOLIC BLOOD PRESSURE: 130 MMHG | WEIGHT: 189.8 LBS

## 2021-10-04 DIAGNOSIS — D75.1 POLYCYTHEMIA: ICD-10-CM

## 2021-10-04 DIAGNOSIS — F32.2 CURRENT SEVERE EPISODE OF MAJOR DEPRESSIVE DISORDER WITHOUT PSYCHOTIC FEATURES WITHOUT PRIOR EPISODE (HCC): ICD-10-CM

## 2021-10-04 DIAGNOSIS — Z23 IMMUNIZATION DUE: ICD-10-CM

## 2021-10-04 DIAGNOSIS — E78.2 MIXED HYPERLIPIDEMIA: Primary | ICD-10-CM

## 2021-10-04 PROBLEM — R03.0 ELEVATED BP WITHOUT DIAGNOSIS OF HYPERTENSION: Status: RESOLVED | Noted: 2021-08-27 | Resolved: 2021-10-04

## 2021-10-04 PROCEDURE — 99214 OFFICE O/P EST MOD 30 MIN: CPT | Performed by: FAMILY MEDICINE

## 2021-10-04 PROCEDURE — 90682 RIV4 VACC RECOMBINANT DNA IM: CPT

## 2021-10-04 PROCEDURE — 90471 IMMUNIZATION ADMIN: CPT

## 2021-10-04 PROCEDURE — 4004F PT TOBACCO SCREEN RCVD TLK: CPT | Performed by: FAMILY MEDICINE

## 2021-10-04 RX ORDER — ROSUVASTATIN CALCIUM 5 MG/1
5 TABLET, COATED ORAL DAILY
Qty: 30 TABLET | Refills: 1 | Status: SHIPPED | OUTPATIENT
Start: 2021-10-04 | End: 2021-12-07 | Stop reason: SDUPTHER

## 2021-10-04 RX ORDER — CHOLECALCIFEROL (VITAMIN D3) 25 MCG
TABLET ORAL
COMMUNITY

## 2021-10-11 ENCOUNTER — VBI (OUTPATIENT)
Dept: ADMINISTRATIVE | Facility: OTHER | Age: 61
End: 2021-10-11

## 2021-10-19 ENCOUNTER — OFFICE VISIT (OUTPATIENT)
Dept: PODIATRY | Facility: CLINIC | Age: 61
End: 2021-10-19

## 2021-10-19 ENCOUNTER — APPOINTMENT (OUTPATIENT)
Dept: LAB | Facility: MEDICAL CENTER | Age: 61
End: 2021-10-19
Payer: COMMERCIAL

## 2021-10-19 VITALS
BODY MASS INDEX: 30.76 KG/M2 | HEART RATE: 83 BPM | HEIGHT: 66 IN | SYSTOLIC BLOOD PRESSURE: 153 MMHG | DIASTOLIC BLOOD PRESSURE: 86 MMHG | WEIGHT: 191.4 LBS

## 2021-10-19 DIAGNOSIS — M20.12 HALLUX VALGUS OF LEFT FOOT: Primary | ICD-10-CM

## 2021-10-19 DIAGNOSIS — E78.2 MIXED HYPERLIPIDEMIA: ICD-10-CM

## 2021-10-19 LAB
ALBUMIN SERPL BCP-MCNC: 3.8 G/DL (ref 3.5–5)
ALP SERPL-CCNC: 80 U/L (ref 46–116)
ALT SERPL W P-5'-P-CCNC: 32 U/L (ref 12–78)
AST SERPL W P-5'-P-CCNC: 22 U/L (ref 5–45)
BILIRUB DIRECT SERPL-MCNC: 0.21 MG/DL (ref 0–0.2)
BILIRUB SERPL-MCNC: 0.69 MG/DL (ref 0.2–1)
CHOLEST SERPL-MCNC: 200 MG/DL (ref 50–200)
HDLC SERPL-MCNC: 67 MG/DL
LDLC SERPL CALC-MCNC: 117 MG/DL (ref 0–100)
LDLC SERPL DIRECT ASSAY-MCNC: 113 MG/DL (ref 0–100)
NONHDLC SERPL-MCNC: 133 MG/DL
PROT SERPL-MCNC: 7.7 G/DL (ref 6.4–8.2)
TRIGL SERPL-MCNC: 81 MG/DL

## 2021-10-19 PROCEDURE — 99024 POSTOP FOLLOW-UP VISIT: CPT | Performed by: PODIATRIST

## 2021-10-19 PROCEDURE — 36415 COLL VENOUS BLD VENIPUNCTURE: CPT

## 2021-10-19 PROCEDURE — 3008F BODY MASS INDEX DOCD: CPT | Performed by: FAMILY MEDICINE

## 2021-10-19 PROCEDURE — 80061 LIPID PANEL: CPT

## 2021-10-19 PROCEDURE — 80076 HEPATIC FUNCTION PANEL: CPT

## 2021-10-19 PROCEDURE — 83721 ASSAY OF BLOOD LIPOPROTEIN: CPT

## 2021-11-02 ENCOUNTER — TELEPHONE (OUTPATIENT)
Dept: FAMILY MEDICINE CLINIC | Facility: MEDICAL CENTER | Age: 61
End: 2021-11-02

## 2021-11-04 ENCOUNTER — VBI (OUTPATIENT)
Dept: ADMINISTRATIVE | Facility: OTHER | Age: 61
End: 2021-11-04

## 2021-11-12 ENCOUNTER — OFFICE VISIT (OUTPATIENT)
Dept: PODIATRY | Facility: CLINIC | Age: 61
End: 2021-11-12

## 2021-11-12 VITALS
SYSTOLIC BLOOD PRESSURE: 156 MMHG | HEIGHT: 66 IN | WEIGHT: 192 LBS | BODY MASS INDEX: 30.86 KG/M2 | HEART RATE: 82 BPM | DIASTOLIC BLOOD PRESSURE: 86 MMHG

## 2021-11-12 DIAGNOSIS — M20.12 HALLUX VALGUS OF LEFT FOOT: Primary | ICD-10-CM

## 2021-11-12 PROCEDURE — 3008F BODY MASS INDEX DOCD: CPT | Performed by: FAMILY MEDICINE

## 2021-11-12 PROCEDURE — 99024 POSTOP FOLLOW-UP VISIT: CPT | Performed by: PODIATRIST

## 2021-11-15 ENCOUNTER — ANNUAL EXAM (OUTPATIENT)
Dept: OBGYN CLINIC | Facility: MEDICAL CENTER | Age: 61
End: 2021-11-15
Payer: COMMERCIAL

## 2021-11-15 VITALS — DIASTOLIC BLOOD PRESSURE: 82 MMHG | SYSTOLIC BLOOD PRESSURE: 120 MMHG | WEIGHT: 192.8 LBS | BODY MASS INDEX: 31.12 KG/M2

## 2021-11-15 DIAGNOSIS — Z80.3 FAMILY HISTORY OF BREAST CANCER IN FEMALE: ICD-10-CM

## 2021-11-15 DIAGNOSIS — Z01.419 ENCOUNTER FOR GYNECOLOGICAL EXAMINATION (GENERAL) (ROUTINE) WITHOUT ABNORMAL FINDINGS: ICD-10-CM

## 2021-11-15 DIAGNOSIS — Z72.0 TOBACCO USE: ICD-10-CM

## 2021-11-15 DIAGNOSIS — Z11.51 SCREENING FOR HPV (HUMAN PAPILLOMAVIRUS): ICD-10-CM

## 2021-11-15 DIAGNOSIS — F17.200 CURRENT EVERY DAY SMOKER: ICD-10-CM

## 2021-11-15 DIAGNOSIS — Z78.0 POSTMENOPAUSAL STATUS: Primary | ICD-10-CM

## 2021-11-15 DIAGNOSIS — N81.4 CYSTOCELE WITH PROLAPSE: ICD-10-CM

## 2021-11-15 PROCEDURE — S0612 ANNUAL GYNECOLOGICAL EXAMINA: HCPCS | Performed by: NURSE PRACTITIONER

## 2021-11-15 PROCEDURE — G0476 HPV COMBO ASSAY CA SCREEN: HCPCS | Performed by: NURSE PRACTITIONER

## 2021-11-15 PROCEDURE — G0145 SCR C/V CYTO,THINLAYER,RESCR: HCPCS | Performed by: NURSE PRACTITIONER

## 2021-11-16 LAB
HPV HR 12 DNA CVX QL NAA+PROBE: NEGATIVE
HPV16 DNA CVX QL NAA+PROBE: NEGATIVE
HPV18 DNA CVX QL NAA+PROBE: NEGATIVE

## 2021-11-17 ENCOUNTER — TELEPHONE (OUTPATIENT)
Dept: GENETICS | Facility: CLINIC | Age: 61
End: 2021-11-17

## 2021-11-19 LAB
LAB AP GYN PRIMARY INTERPRETATION: NORMAL
Lab: NORMAL

## 2021-12-07 DIAGNOSIS — E78.2 MIXED HYPERLIPIDEMIA: ICD-10-CM

## 2021-12-07 RX ORDER — ROSUVASTATIN CALCIUM 5 MG/1
5 TABLET, COATED ORAL DAILY
Qty: 30 TABLET | Refills: 1 | Status: SHIPPED | OUTPATIENT
Start: 2021-12-07 | End: 2022-02-01

## 2021-12-23 ENCOUNTER — HOSPITAL ENCOUNTER (OUTPATIENT)
Dept: MAMMOGRAPHY | Facility: CLINIC | Age: 61
Discharge: HOME | End: 2021-12-23
Payer: COMMERCIAL

## 2021-12-23 DIAGNOSIS — Z12.31 VISIT FOR SCREENING MAMMOGRAM: ICD-10-CM

## 2021-12-23 PROCEDURE — 77067 SCR MAMMO BI INCL CAD: CPT

## 2021-12-23 PROCEDURE — 77063 BREAST TOMOSYNTHESIS BI: CPT

## 2021-12-30 ENCOUNTER — OFFICE VISIT (OUTPATIENT)
Dept: PODIATRY | Facility: CLINIC | Age: 61
End: 2021-12-30

## 2021-12-30 VITALS
WEIGHT: 192 LBS | SYSTOLIC BLOOD PRESSURE: 150 MMHG | BODY MASS INDEX: 30.86 KG/M2 | HEIGHT: 66 IN | HEART RATE: 76 BPM | DIASTOLIC BLOOD PRESSURE: 86 MMHG

## 2021-12-30 DIAGNOSIS — M20.12 HALLUX VALGUS OF LEFT FOOT: Primary | ICD-10-CM

## 2021-12-30 PROCEDURE — 99024 POSTOP FOLLOW-UP VISIT: CPT | Performed by: PODIATRIST

## 2022-02-01 DIAGNOSIS — E78.2 MIXED HYPERLIPIDEMIA: ICD-10-CM

## 2022-02-01 RX ORDER — ROSUVASTATIN CALCIUM 5 MG/1
TABLET, COATED ORAL
Qty: 30 TABLET | Refills: 1 | Status: SHIPPED | OUTPATIENT
Start: 2022-02-01 | End: 2022-04-04

## 2022-04-02 DIAGNOSIS — E78.2 MIXED HYPERLIPIDEMIA: ICD-10-CM

## 2022-04-04 ENCOUNTER — RA CDI HCC (OUTPATIENT)
Dept: OTHER | Facility: HOSPITAL | Age: 62
End: 2022-04-04

## 2022-04-04 RX ORDER — ROSUVASTATIN CALCIUM 5 MG/1
TABLET, COATED ORAL
Qty: 30 TABLET | Refills: 1 | Status: SHIPPED | OUTPATIENT
Start: 2022-04-04 | End: 2022-06-28 | Stop reason: SDUPTHER

## 2022-04-04 NOTE — PROGRESS NOTES
[facility]  coding opportunities          Chart Reviewed number of suggestions sent to Provider: 1   With the beginning of the new year, this is a reminder to address ALL [facility]  (risk adjustment) codes for 2022 as patient scores reset to zero ALEX     F32 2 Current severe episode of major depressive disorder without 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 episode ([facility] )    Patients Insurance        Commercial Insurance: [address] [place] [address]

## 2022-06-28 DIAGNOSIS — E78.2 MIXED HYPERLIPIDEMIA: ICD-10-CM

## 2022-06-28 RX ORDER — ROSUVASTATIN CALCIUM 5 MG/1
5 TABLET, COATED ORAL DAILY
Qty: 90 TABLET | Refills: 0 | Status: SHIPPED | OUTPATIENT
Start: 2022-06-28

## 2022-08-12 ENCOUNTER — OFFICE VISIT (OUTPATIENT)
Dept: FAMILY MEDICINE CLINIC | Facility: MEDICAL CENTER | Age: 62
End: 2022-08-12
Payer: COMMERCIAL

## 2022-08-12 VITALS
OXYGEN SATURATION: 100 % | SYSTOLIC BLOOD PRESSURE: 122 MMHG | HEIGHT: 66 IN | DIASTOLIC BLOOD PRESSURE: 82 MMHG | HEART RATE: 84 BPM | BODY MASS INDEX: 30.22 KG/M2 | TEMPERATURE: 98.2 F | WEIGHT: 188 LBS

## 2022-08-12 DIAGNOSIS — F41.9 ANXIETY: ICD-10-CM

## 2022-08-12 DIAGNOSIS — E78.2 MIXED HYPERLIPIDEMIA: Primary | ICD-10-CM

## 2022-08-12 DIAGNOSIS — Z72.0 TOBACCO USE: ICD-10-CM

## 2022-08-12 DIAGNOSIS — F32.2 CURRENT SEVERE EPISODE OF MAJOR DEPRESSIVE DISORDER WITHOUT PSYCHOTIC FEATURES WITHOUT PRIOR EPISODE (HCC): ICD-10-CM

## 2022-08-12 DIAGNOSIS — Z11.4 ENCOUNTER FOR SCREENING FOR HIV: ICD-10-CM

## 2022-08-12 DIAGNOSIS — Z12.31 SCREENING MAMMOGRAM FOR BREAST CANCER: ICD-10-CM

## 2022-08-12 DIAGNOSIS — Z13.21 ENCOUNTER FOR VITAMIN DEFICIENCY SCREENING: ICD-10-CM

## 2022-08-12 PROCEDURE — 99214 OFFICE O/P EST MOD 30 MIN: CPT

## 2022-08-12 RX ORDER — ROSUVASTATIN CALCIUM 5 MG/1
5 TABLET, COATED ORAL DAILY
Qty: 90 TABLET | Refills: 1 | Status: SHIPPED | OUTPATIENT
Start: 2022-08-12

## 2022-08-12 NOTE — PROGRESS NOTES
Assessment/Plan:    Colonoscopy up to date  Mammogram up to date, next due 12/2022  Ordered  Fasting blood work ordered  Follow up in 6 months or sooner if needed  Lung CT screening ordered  Last done 2019  PVC 20 due next visit  Encouraged healthy diet and exercise/stay active  1  Mixed hyperlipidemia  Lipids improved per last labs since starting rosuvastatin 5 mg daily  Continue rosuvastatin  Check labs to monitor lipid levels and hepatic function  - Comprehensive metabolic panel; Future  - Lipid panel; Future  - rosuvastatin (CRESTOR) 5 mg tablet; Take 1 tablet (5 mg total) by mouth daily  Dispense: 90 tablet; Refill: 1    2  Current severe episode of major depressive disorder without 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 episode ([facility] [address] )  Stable  Denies suicidal ideation  No current medication regimen  Coping well  3  Anxiety  Stable  Coping well  No current medication regimen  4  Tobacco use  Patient agreeable  Last CT lung screening 2019  Patient is still an everyday smoker  Does not demonstrate readiness to quit  Encouraged patient to quit smoking    - CT lung screening program; Future    5  Screening mammogram for breast cancer  Last mammogram 12/2021  Ordered, advised patient to schedule for 12/2022  - Mammo screening bilateral w 3d & cad; Future    6  Encounter for vitamin deficiency screening  - Vitamin D 25 hydroxy; Future    7  Encounter for screening for HIV  Patient agreeable  - HIV 1/2 Antigen/Antibody (4th Generation) w Reflex [facility]; Future      Subjective:      Patient ID: Bakari Mcleod is a 57 y o  female  73-year-old female presents for follow-up and to establish care with new provider  She is doing well overall  She retired a few months ago  She has hyperlipidemia  She was started on rosuvastatin 5 mg daily at her last office visit  Repeat lipid panel showed improvement in lipid levels with stable hepatic function  She is tolerating medication well    She has depression and anxiety  Currently stable  Does not wish to start any medications at this time  She was previously on medication and did not like how it made her feel  Denies suicidal ideation  She reports that her depression and anxiety is longstanding for over 25 years  She does have some family issues  She reports that her relationship with her son has not been well and she has not had contact with him for about 2 5 years  Prior to this her relationship with him was good and then 1 day he stopped talking with her and she has not had contact with him since  She attributes some of her depression and anxiety to this however reports this is longstanding and she is able to cope well with it without use of medications  She is a current smoker  Does not demonstrate readiness to quit  She does admit that she has been smoking more frequently 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  The following portions of the patient's history were reviewed and updated as appropriate: allergies, current medications, past family history, past social history, past surgical history and problem list     Review of Systems   Constitutional: Negative  HENT: Negative  Eyes: Negative  Respiratory: Negative  Cardiovascular: Negative  Gastrointestinal: Negative  Endocrine: Negative  Genitourinary: Negative  Musculoskeletal: Negative  Skin: Negative  Allergic/Immunologic: Negative  Neurological: Negative  Hematological: Negative  Psychiatric/Behavioral: Negative  Objective:      /82 (BP Location: Left arm, Patient Position: Sitting, Cuff Size: Adult)   Pulse 84   Temp 98 2 °F (36 8 °C)   Ht 5' 6" (1 676 m)   Wt 85 3 kg (188 lb)   SpO2 100%   BMI 30 34 kg/m²          Physical Exam  Vitals and nursing note reviewed  Constitutional:       General: She is not in acute distress  Appearance: Normal appearance  She is not ill-appearing     HENT:      Head: Normocephalic and atraumatic  Eyes:      General:         Right eye: No discharge  Left eye: No discharge  Conjunctiva/sclera: Conjunctivae normal       Pupils: Pupils are equal, round, and reactive to light  Cardiovascular:      Rate and Rhythm: Normal rate and regular rhythm  Pulses: Normal pulses  Heart sounds: Normal heart sounds  No murmur heard  Pulmonary:      Effort: Pulmonary effort is normal  No respiratory distress  Breath sounds: Normal breath sounds  No wheezing  Musculoskeletal:         General: Normal range of motion  Cervical back: Normal range of motion and neck supple  Skin:     General: Skin is warm and dry  Neurological:      General: No focal deficit present  Mental Status: She is alert and oriented to person, place, and time  Mental status is at baseline  Psychiatric:         Attention and Perception: Attention normal          Mood and Affect: Mood normal          Speech: Speech normal          Behavior: Behavior normal  Behavior is cooperative  Thought Content: Thought content normal  Thought content does not include suicidal ideation  Thought content does not include suicidal plan  BMI Counseling: Body mass index is 30 34 kg/m²  The BMI is above normal  Nutrition recommendations include encouraging healthy choices of fruits and vegetables, increasing intake of lean protein and reducing intake of saturated and trans fat  Exercise recommendations include moderate physical activity 150 minutes/week and exercising 3-5 times per week  No pharmacotherapy was ordered  Rationale for BMI follow-up plan is due to patient being overweight or obese               NABOR Steiner

## 2023-02-21 NOTE — PROGRESS NOTES
Assessment/Plan:       Diagnoses and all orders for this visit:    Preop examination  Preop testing not previously ordered  Bunion, left  Scheduled for bunionectomy on 9/10/2021  Mixed hyperlipidemia  -     Lipid panel; Future  -     LDL cholesterol, direct; Future  -     Comprehensive metabolic panel; Future  Previous hyperlipidemia and also with elevated ASCVD risk  Patient is at elevated risk for MI and CVA  Statin therapy recommended  Patient declined  She wishes to repeat her labs to check her lipid levels currently  Polycythemia  -     CBC and differential; Future  -     Iron Panel (Includes Ferritin, Iron Sat%, Iron, and TIBC); Future  Polycythemia likely secondary to tobacco use  Check labs to monitor  Current severe episode of major depressive disorder without 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 episode ([facility] [address] )  Depression persists  Patient refuses treatment  No suicidal ideation  Monitor  Anxiety  Anxiety persists  Patient refuses treatment  Monitor  Elevated BP without diagnosis of hypertension  -     Lipid panel; Future  -     LDL cholesterol, direct; Future  -     Comprehensive metabolic panel; Future  -     Hemoglobin A1C; Future  -     TSH, 3rd generation; Future  -     T4, free; Future  -     UA (URINE) with reflex to Scope  Blood pressure is elevated  I suspect this is a combination of obesity and tobacco use  I am not going to start any medication at this time  Check labs  Drug allergy  Please note multiple drug allergies  Tobacco use  Patient highly encouraged to stop smoking  Patient is not medically optimized for proposed surgery  Follow-up in one month or sooner if needed  Subjective:      Patient ID: Estephania Nava is a 59 y o  female  Patient presents for preoperative evaluation  She is scheduled to have left bunionectomy surgery with Dr Mervat Mckee on 9/10/2021      Patient has had surgery in the past with no difficulty with anesthesia  Patient has no history of MI or CVA  Patient has no shortness of breath or chest pain with exertion such as walking four blocks or walking up two flights of stairs  Patient has hyperlipidemia  Not on any medication at this time  Does not desire to start any medication untill labs are repeated  Patient has polycythemia  Not receiving any treatment at this time  Patient has elevated blood pressure  Blood pressure elevated in the office today  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 as well  Patient has depression  Not currently on any medication for depression  No suicidal ideation  Does not desire treatment for depression  Patient has anxiety  Not currently on any medication for anxiety  Does not desire treatment for anxiety  Patient has drug allergies  Patient continues to smoke  No acute concerns  The following portions of the patient's history were reviewed and updated as appropriate: She  has a past medical history of Anxiety and depression, Bowel disease, Chronic obstructive lung disease ([address] ), Hypercholesterolemia, Hyperlipidemia, IBS (irritable bowel syndrome), Sleep disturbance, Urinary incontinence, and Vocal cord polyp    She   Patient Active Problem List    Diagnosis Date Noted    Bunion, left 08/27/2021    Elevated BP without diagnosis of hypertension 08/27/2021    Tobacco use 08/27/2021    Current severe episode of major depressive disorder without 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 episode ([facility] [address] ) 09/03/2019    Anxiety 09/03/2019    Insomnia 09/03/2019    Breast pain 12/28/2018    Cervical arthritis 06/05/2017    Thyroid nodule 06/05/2017    Abnormal ultrasound of neck 05/25/2017    Polycythemia 05/15/2017    Hyperlipidemia 05/15/2017    Acid reflux 05/12/2017    Neck mass 05/12/2017    Snoring 05/12/2017     She  has a past surgical history that includes Cholecystectomy; Breast cyst excision (Left, 1989); Rhinoplasty; and US guided thyroid biopsy (5/15/2019)  Her family history includes Breast cancer (age of onset: 39) in her maternal aunt; Breast cancer (age of onset: 80) in her maternal grandmother; Diabetes in her sister; Hypertension in her father and mother; Skin cancer in her father; Thyroid cancer in her maternal aunt; Thyroid disease in her sister; Varicose Veins in her mother  She  reports that she has been smoking  She has never used smokeless tobacco  She reports that she does not drink alcohol and does not use drugs  Current Outpatient Medications   Medication Sig Dispense Refill    ascorbic acid (VITAMIN C) 500 mg tablet Take 1,000 mg by mouth daily      Multiple Vitamins-Minerals (MULTIVITAMIN WITH MINERALS) tablet Take 1 tablet by mouth daily       No current facility-administered medications for this visit  Current Outpatient Medications on File Prior to Visit   Medication Sig    ascorbic acid (VITAMIN C) 500 mg tablet Take 1,000 mg by mouth daily    Multiple Vitamins-Minerals (MULTIVITAMIN WITH MINERALS) tablet Take 1 tablet by mouth daily    [DISCONTINUED] FLUoxetine (PROzac) 40 MG capsule Take 1 capsule (40 mg total) by mouth daily (Patient not taking: Reported on 8/27/2021)     No current facility-administered medications on file prior to visit  She is allergic to iodinated diagnostic agents and amoxicillin-pot clavulanate       Review of Systems   Constitutional: Negative for fever  Respiratory: Negative for shortness of breath  Cardiovascular: Negative for chest pain  Gastrointestinal: Negative for abdominal pain and blood in stool  Genitourinary: Negative for dysuria and hematuria  Musculoskeletal: Negative for arthralgias and myalgias  Skin: Negative for rash  Neurological: Negative for headaches           Objective:      /94 (BP Location: Left arm, Patient Position: Sitting, Cuff Size: Large)   Pulse 88   Temp 97 9 °F (36 6 °C)   Ht 5' 6" (1 676 m)   Wt 85 3 kg (188 lb)   SpO2 98%   BMI 30 34 kg/m²          Physical Exam  Constitutional:       General: She is not in acute distress  Appearance: She is obese  She is not ill-appearing  Comments: No nose, mouth or throat complaints  Nose, mouth and throat not examined  Mask in place  COVID-19 pandemic  HENT:      Head: Normocephalic and atraumatic  Right Ear: Tympanic membrane, ear canal and external ear normal       Left Ear: Tympanic membrane, ear canal and external ear normal    Eyes:      General: Lids are normal       Conjunctiva/sclera: Conjunctivae normal       Pupils: Pupils are equal, round, and reactive to light  Neck:      Trachea: Trachea normal    Cardiovascular:      Rate and Rhythm: Normal rate and regular rhythm  Heart sounds: S1 normal and S2 normal  No murmur heard  Pulmonary:      Effort: Pulmonary effort is normal       Breath sounds: Normal breath sounds  Abdominal:      General: Bowel sounds are normal       Palpations: Abdomen is soft  Tenderness: There is no abdominal tenderness  Musculoskeletal:         General: Normal range of motion  Skin:     General: Skin is warm and dry  Neurological:      Mental Status: She is alert and oriented to person, place, and time  Psychiatric:         Speech: Speech normal          Behavior: Behavior normal          Thought Content:  Thought content normal  Sotyktu Counseling:  I discussed the most common side effects of Sotyktu including: common cold, sore throat, sinus infections, cold sores, canker sores, folliculitis, and acne.  I also discussed more serious side effects of Sotyktu including but not limited to: serious allergic reactions; increased risk for infections such as TB; cancers such as lymphomas; rhabdomyolysis and elevated CPK; and elevated triglycerides and liver enzymes.

## 2023-03-13 ENCOUNTER — HOSPITAL ENCOUNTER (OUTPATIENT)
Dept: MAMMOGRAPHY | Facility: CLINIC | Age: 63
Discharge: HOME | End: 2023-03-13

## 2023-03-13 VITALS — BODY MASS INDEX: 30.22 KG/M2 | WEIGHT: 188 LBS | HEIGHT: 66 IN

## 2023-03-13 DIAGNOSIS — Z12.31 SCREENING MAMMOGRAM FOR BREAST CANCER: ICD-10-CM

## 2023-03-17 DIAGNOSIS — E78.2 MIXED HYPERLIPIDEMIA: ICD-10-CM

## 2023-03-17 RX ORDER — ROSUVASTATIN CALCIUM 5 MG/1
TABLET, COATED ORAL
Qty: 90 TABLET | Refills: 0 | Status: SHIPPED | OUTPATIENT
Start: 2023-03-17

## 2023-03-24 ENCOUNTER — HOSPITAL ENCOUNTER (OUTPATIENT)
Dept: RADIOLOGY | Facility: MEDICAL CENTER | Age: 63
Discharge: HOME | End: 2023-03-24

## 2023-03-24 ENCOUNTER — OFFICE VISIT (OUTPATIENT)
Dept: FAMILY MEDICINE CLINIC | Facility: MEDICAL CENTER | Age: 63
End: 2023-03-24

## 2023-03-24 ENCOUNTER — APPOINTMENT (OUTPATIENT)
Dept: LAB | Facility: MEDICAL CENTER | Age: 63
End: 2023-03-24

## 2023-03-24 VITALS
HEIGHT: 66 IN | WEIGHT: 183 LBS | DIASTOLIC BLOOD PRESSURE: 82 MMHG | RESPIRATION RATE: 16 BRPM | HEART RATE: 92 BPM | TEMPERATURE: 98.8 F | SYSTOLIC BLOOD PRESSURE: 132 MMHG | OXYGEN SATURATION: 97 % | BODY MASS INDEX: 29.41 KG/M2

## 2023-03-24 DIAGNOSIS — E78.2 MIXED HYPERLIPIDEMIA: ICD-10-CM

## 2023-03-24 DIAGNOSIS — Z23 ENCOUNTER FOR IMMUNIZATION: ICD-10-CM

## 2023-03-24 DIAGNOSIS — Z13.21 ENCOUNTER FOR VITAMIN DEFICIENCY SCREENING: ICD-10-CM

## 2023-03-24 DIAGNOSIS — Z11.4 ENCOUNTER FOR SCREENING FOR HIV: ICD-10-CM

## 2023-03-24 DIAGNOSIS — Z00.00 WELCOME TO MEDICARE PREVENTIVE VISIT: Primary | ICD-10-CM

## 2023-03-24 DIAGNOSIS — Z78.0 POSTMENOPAUSAL: ICD-10-CM

## 2023-03-24 DIAGNOSIS — F32.2 CURRENT SEVERE EPISODE OF MAJOR DEPRESSIVE DISORDER WITHOUT PSYCHOTIC FEATURES WITHOUT PRIOR EPISODE (HCC): ICD-10-CM

## 2023-03-24 DIAGNOSIS — F41.9 ANXIETY: ICD-10-CM

## 2023-03-24 DIAGNOSIS — Z72.0 TOBACCO USE: ICD-10-CM

## 2023-03-24 DIAGNOSIS — Z13.820 SCREENING FOR OSTEOPOROSIS: ICD-10-CM

## 2023-03-24 DIAGNOSIS — Z12.11 COLON CANCER SCREENING: ICD-10-CM

## 2023-03-24 LAB
25(OH)D3 SERPL-MCNC: 44.3 NG/ML (ref 30–100)
ALBUMIN SERPL BCP-MCNC: 4 G/DL (ref 3.5–5)
ALP SERPL-CCNC: 62 U/L (ref 46–116)
ALT SERPL W P-5'-P-CCNC: 34 U/L (ref 12–78)
ANION GAP SERPL CALCULATED.3IONS-SCNC: 5 MMOL/L (ref 4–13)
AST SERPL W P-5'-P-CCNC: 27 U/L (ref 5–45)
BILIRUB SERPL-MCNC: 0.49 MG/DL (ref 0.2–1)
BUN SERPL-MCNC: 12 MG/DL (ref 5–25)
CALCIUM SERPL-MCNC: 10.6 MG/DL (ref 8.3–10.1)
CHLORIDE SERPL-SCNC: 106 MMOL/L (ref 96–108)
CHOLEST SERPL-MCNC: 213 MG/DL
CO2 SERPL-SCNC: 26 MMOL/L (ref 21–32)
CREAT SERPL-MCNC: 0.75 MG/DL (ref 0.6–1.3)
GFR SERPL CREATININE-BSD FRML MDRD: 83 ML/MIN/1.73SQ M
GLUCOSE P FAST SERPL-MCNC: 103 MG/DL (ref 65–99)
HDLC SERPL-MCNC: 59 MG/DL
LDLC SERPL CALC-MCNC: 134 MG/DL (ref 0–100)
NONHDLC SERPL-MCNC: 154 MG/DL
POTASSIUM SERPL-SCNC: 4.6 MMOL/L (ref 3.5–5.3)
PROT SERPL-MCNC: 7.6 G/DL (ref 6.4–8.4)
SODIUM SERPL-SCNC: 137 MMOL/L (ref 135–147)
TRIGL SERPL-MCNC: 101 MG/DL

## 2023-03-24 RX ORDER — BUPROPION HYDROCHLORIDE 150 MG/1
150 TABLET ORAL 2 TIMES DAILY
Qty: 180 TABLET | Refills: 0 | Status: SHIPPED | OUTPATIENT
Start: 2023-03-24 | End: 2023-09-20

## 2023-03-24 NOTE — PATIENT INSTRUCTIONS
Start Wellbutrin 150 mg, take 1 pill once daily x 3 days then increase to twice daily, 8 hours apart  Stop smoking 5 to 7 days after treatment initiation  Ensure taking doses at least 8 hours apart, last dose no later than 6 PM      We will follow-up in office in 12 weeks, sooner if needed  Dexa scan is due, please call and schedule this  Colonoscopy is due, please schedule  Pneumonia vaccine was updated today

## 2023-03-24 NOTE — PROGRESS NOTES
Assessment and Plan:   Colonoscopy due, patient agreeable  Mammogram up-to-date  DEXA scan due, patient agreeable  Fasting labs done today, results pending  Lung CT scheduled for next week  PCV 20 updated today  Start Wellbutrin, take as directed for smoking cessation  Follow-up in 12 weeks, sooner if needed  Problem List Items Addressed This Visit        Other    Hyperlipidemia    Current severe episode of major depressive disorder without 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 episode (HCC)    Relevant Medications    buPROPion (WELLBUTRIN XL) 150 mg 24 hr tablet    Anxiety    Tobacco use    Relevant Medications    buPROPion (WELLBUTRIN XL) 150 mg 24 hr tablet   Other Visit Diagnoses     Welcome to Medicare preventive visit    -  Primary    Encounter for immunization        Relevant Orders    Pneumococcal Conjugate Vaccine 20-valent (PCV20) (Completed)    Colon cancer screening        Relevant Orders    Ambulatory referral for colonoscopy    Postmenopausal        Relevant Orders    DXA bone density spine hip and pelvis    Screening for osteoporosis        Relevant Orders    DXA bone density spine hip and pelvis        BMI Counseling: Body mass index is 29 54 kg/m²  The BMI is above normal  Nutrition recommendations include encouraging healthy choices of fruits and vegetables and increasing intake of lean protein  Exercise recommendations include exercising 3-5 times per week  No pharmacotherapy was ordered  Rationale for BMI follow-up plan is due to patient being overweight or obese  Depression Screening and Follow-up Plan: Their PHQ-9 score was 12  Patient declines further evaluation by mental health professional and/or medications  Brief counseling provided  Will re-evaluate at next office visit  Patient with history of depression and anxiety  She reports this comes in waves    She feels as though this is under good control at this time and does not wish to restart any medications or see a therapist again  Tobacco Cessation Counseling: Tobacco cessation counseling was provided  The patient is sincerely urged to quit consumption of tobacco  She is ready to quit tobacco  Medication options and side effects of medication discussed  Patient agreed to medication  Bupropion SR was prescribed  Patient advised to take bupropion 150 mg once daily x3 days then increase to twice daily x7 to 12 weeks  Stop smoking between 5 and 7 days after initiating medication  Follow-up in office in 12 weeks, sooner if needed  Lung Cancer Screening Shared Decision Making: I discussed with her that she is a candidate for lung cancer CT screening  The following Shared Decision-Making points were covered:  1  Benefits of screening were discussed, including the rates of reduction in death from lung cancer and other causes  Harms of screening were reviewed, including false positive tests, radiation exposure levels, risks of invasive procedures, risks of complications of screening, and risk of overdiagnosis  2  I counseled on the importance of adherence to annual lung cancer LDCT screening, impact of co-morbidities, and ability or willingness to undergo diagnosis and treatment  3  I counseled on the importance of maintaining abstinence as a former smoker or was counseled on the importance of smoking cessation if a current smoker    Review of Eligibility Criteria: She meets all of the criteria for Lung Cancer Screening    - She is 70 y o    - She has 20 pack year tobacco history and is a current smoker or has quit within the past 15 years  - She presents no signs or symptoms of lung cancer    After discussion, the patient decided to elect lung cancer screening  Preventive health issues were discussed with patient, and age appropriate screening tests were ordered as noted in patient's After Visit Summary    Personalized health advice and appropriate referrals for health education or preventive services given if needed, as noted in patient's After Visit Summary  History of Present Illness:     Patient presents for a Medicare Wellness Visit    39-year-old female presents for welcome to Medicare visit  She appears to be doing well overall  She lives at home with her , is able to drive and get around without difficulty as well as manage medications, shopping and cooking  She has a history of hearing impairment, currently uses hearing aids however only has one in on the right today  She reports regular audiology exams  She does not wish to have vision screening today as she sees an eye doctor regularly for vision exams  She has a history of depression  She was previously taking medication for this however, she discontinued this due to everything feeling numb and she did not like how she felt on it  She also has attended therapy in the past however is no longer attending  She reports a lot of her depression sx stem from family issues which she tells me she is coping well with and it comes in waves  She does not wish to take medications for this as she feels as though this is under good control at this time  She has hyperlipidemia  Currently on rosuvastatin  Tolerating medication well  Colonoscopy due, patient agreeable  Mammogram up-to-date  DEXA scan due, patient agreeable  Fasting labs done today, results pending  Lung CT scheduled for next week  She expresses interest in quitting smoking at this time  Her  recently started Wellbutrin a few days ago for smoking cessation and she is interested in starting this as well as she states she is ready to quit  She tells me she has tried Chantix in the past and it did not work for her  She currently smokes approximately 1 5 PPD       Patient Care Team:  Lorie Snyder as PCP - General (Nurse Practitioner)  Renetta Mathews DO [facility]  Caitlin Jefferson MD     Review of Systems:     Review of Systems   Constitutional: Negative  HENT: Negative  Eyes: Negative  Respiratory: Negative  Cardiovascular: Negative  Gastrointestinal: Negative  Endocrine: Negative  Genitourinary: Negative  Musculoskeletal: Negative  Skin: Negative  Allergic/Immunologic: Negative  Neurological: Negative  Hematological: Negative  Psychiatric/Behavioral: Negative           Problem List:     Patient Active Problem List   Diagnosis   • Breast pain   • Abnormal ultrasound of neck   • Acid reflux   • Cervical arthritis   • Polycythemia   • Hyperlipidemia   • Neck mass   • Snoring   • Thyroid nodule   • Current severe episode of major depressive disorder without 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 episode ([facility] [address] )   • Anxiety   • Insomnia   • Bunion, left   • Tobacco use   • Encounter for gynecological examination (general) (routine) without abnormal findings   • Cystocele with prolapse   • Family history of breast cancer in female      Past Medical and Surgical History:     Past Medical History:   Diagnosis Date   • Anxiety    • Anxiety and depression    • Bowel disease    • Chronic obstructive lung disease ([facility] [address] )    • Depression    • Hypercholesterolemia    • Hyperlipidemia    • IBS (irritable bowel syndrome)    • Shortness of breath    • Sleep disturbance    • Urinary incontinence    • Vocal cord polyp      Past Surgical History:   Procedure Laterality Date   • BREAST CYST EXCISION Left 1989    Benign   • BUNIONECTOMY Left 9/10/2021    Procedure: OSTEOTOMY OF PROXIMAL PHALAX GREAT TOE;  Surgeon: Mele Suh DPM;  Location: [facility];  Service: Podiatry   • CHOLECYSTECTOMY     • CYST REMOVAL      on back   • SD CORRJ HALLUX VALGUS W/SESMDC W/DIST [facility] Left 9/10/2021    Procedure: BUNIONECTOMY JAIDA FOOT;  Surgeon: Mele Suh DPM;  Location: [facility];  Service: Podiatry   • RHINOPLASTY     • US GUIDED THYROID BIOPSY  5/15/2019      Family History:     Family History   Problem Relation Age of Onset   • Hypertension Mother • Varicose Veins Mother    • Hypertension Father    • Skin cancer Father    • Diabetes Sister    • Thyroid disease Sister    • No Known Problems Daughter    • Ovarian cancer Maternal Grandmother 80   • No Known Problems Paternal Grandmother    • Breast cancer Maternal Aunt 39   • Thyroid cancer Maternal Aunt    • Thyroid disease Maternal Aunt    • Skin cancer Brother       Social History:     Social History     Socioeconomic History   • Marital status: /Civil Union     Spouse name: None   • Number of children: 2   • Years of education: None   • Highest education level: None   Occupational History   • None   Tobacco Use   • Smoking status: Every Day     Packs/day: 1 50     Types: Cigarettes     Start date: 1975   • Smokeless tobacco: Never   • Tobacco comments:     last time 9/10/2021   Vaping Use   • Vaping Use: Never used   Substance and Sexual Activity   • Alcohol use: Yes     Alcohol/week: 2 0 standard drinks     Types: 2 Glasses of wine per week     Comment: weekly   • Drug use: No   • Sexual activity: Yes     Partners: Male     Birth control/protection: Post-menopausal   Other Topics Concern   • None   Social History Narrative    Alcohol use - As per [employer]    Being a social drinker - As per [employer]    Caffeine use    Coffee    Drinks caffeinated tea    Exercise habits    Partner had vasectomy    Sexually active - As per [employer]     Social Determinants of Health     Financial Resource Strain: Low Risk    • Difficulty of Paying Living Expenses: Not very hard   Food Insecurity: Not on file   Transportation Needs: No Transportation Needs   • Lack of Transportation (Medical): No   • Lack of Transportation (Non-Medical):  No   Physical Activity: Not on file   Stress: Not on file   Social Connections: Not on file   Intimate Partner Violence: Not on file   Housing Stability: Not on file      Medications and Allergies:     Current Outpatient Medications   Medication Sig Dispense Refill   • ascorbic acid (VITAMIN C) 500 mg tablet Take 1,000 mg by mouth daily     • aspirin 81 mg chewable tablet Chew 81 mg daily     • buPROPion (WELLBUTRIN XL) 150 mg 24 hr tablet Take 1 tablet (150 mg total) by mouth 2 (two) times a day 180 tablet 0   • Cholecalciferol (Vitamin D3) 25 MCG TABS Take by mouth     • MAGNESIUM PO Take by mouth     • Multiple Vitamins-Minerals (MULTIVITAMIN WITH MINERALS) tablet Take 2 tablets by mouth daily       • rosuvastatin (CRESTOR) 5 mg tablet TAKE ONE TABLET BY MOUTH EVERY DAY 90 tablet 0   • vitamin E, tocopherol, 1,000 units capsule Take 1,000 Units by mouth daily       No current facility-administered medications for this visit  Allergies   Allergen Reactions   • Amoxicillin-Pot Clavulanate Rash   • Iodinated Contrast Media Hives and Itching      Immunizations:     Immunization History   Administered Date(s) Administered   • COVID-19 J&J (Nirmal) vaccine 0 5 mL 03/13/2021, 12/19/2021   • Influenza, recombinant, quadrivalent,injectable, preservative free 10/04/2021   • Pneumococcal Conjugate Vaccine 20-valent (Pcv20), Polysace 03/24/2023   • Pneumococcal Polysaccharide PPV23 08/27/2021   • Tdap 08/27/2021      Health Maintenance:         Topic Date Due   • HIV Screening  Never done   • Colorectal Cancer Screening  03/16/2023   • Breast Cancer Screening: Mammogram  03/13/2025   • Hepatitis C Screening  Completed         Topic Date Due   • COVID-19 Vaccine (3 - Booster for Nirmal series) 02/13/2022      Medicare Screening Tests and Risk Assessments:     Aris Flores is here for her Welcome to Medicare visit  Health Risk Assessment:   Patient rates overall health as good  Patient feels that their physical health rating is same  Patient is satisfied with their life  Eyesight was rated as same  Hearing was rated as slightly worse  Patient feels that their emotional and mental health rating is same  Patients states they are never, rarely angry   Patient states they are often unusually tired/fatigued  Pain experienced in the last 7 days has been none  Patient states that she has experienced no weight loss or gain in last 6 months  Currently wears hearing aids, only has right one in today  She has seen Audiology in the past, reports last visit was 1 year ago  She will call for follow up visit with their office  Declines vision exam as she sees her eye doctor yearly for eye exams and glasses  Fall Risk Screening: In the past year, patient has experienced: no history of falling in past year      Urinary Incontinence Screening:   Patient has not leaked urine accidently in the last six months  Home Safety:  Patient does not have trouble with stairs inside or outside of their home  Patient has working smoke alarms and has working carbon monoxide detector  Home safety hazards include: none  Nutrition:   Current diet is Regular  Reports drinking approx  5-6 glasses of white wine per week  Does not drink daily, however some days she has 2-3 glasses  Medications:   Patient is currently taking over-the-counter supplements  OTC medications include: see medication list  Patient is able to manage medications  Activities of Daily Living (ADLs)/Instrumental Activities of Daily Living (IADLs):   Walk and transfer into and out of bed and chair?: Yes  Dress and groom yourself?: Yes    Bathe or shower yourself?: Yes    Feed yourself? Yes  Do your laundry/housekeeping?: Yes  Manage your money, pay your bills and track your expenses?: Yes  Make your own meals?: Yes    Do your own shopping?: Yes    Previous Hospitalizations:   Any hospitalizations or ED visits within the last 12 months?: No      Advance Care Planning:   Living will: Yes    Durable POA for healthcare: Yes    Advanced directive: Yes      Comments: Currently revising living will and POA with       Cognitive Screening:   Provider or family/friend/caregiver concerned regarding cognition?: No    PREVENTIVE SCREENINGS Cardiovascular Screening:    General: Screening Current and History Lipid Disorder      Diabetes Screening:     General: Screening Current      Colorectal Cancer Screening:     General: Screening Current    Due for: Colonoscopy - Low Risk      Breast Cancer Screening:     General: Screening Current      Cervical Cancer Screening:    General: Screening Not Indicated      Lung Cancer Screening:     General: Screening Not Indicated    Due for: Low Dose CT (LDCT)      Hepatitis C Screening:    General: Screening Current      Preventive Screening Comments: Lung CT screening scheduled for next week  Current smoker  Mammo utd  Colonoscopy due, order placed  Screening, Brief Intervention, and Referral to Treatment (SBIRT)    Screening  Typical number of drinks in a day: 1  Typical number of drinks in a week: 3  Interpretation: Low risk drinking behavior  AUDIT-C Screenin) How often did you have a drink containing alcohol in the past year? 2 to 3 times a week  2) How many drinks did you have on a typical day when you were drinking in the past year? 1 to 2  3) How often did you have 6 or more drinks on one occasion in the past year? never    AUDIT-C Score: 3  Interpretation: Score 3-12 (female): POSITIVE screen for alcohol misuse    Single Item Drug Screening:  How often have you used an illegal drug (including marijuana) or a prescription medication for non-medical reasons in the past year? never    Single Item Drug Screen Score: 0  Interpretation: Negative screen for possible drug use disorder    Brief Intervention      Discussed healthy amount of alcohol daily, reports 5-6 drinks of wine per week on average  Denies excessive use of alcohol  Other Counseling Topics:   Car/seat belt/driving safety, skin self-exam, sunscreen and calcium and vitamin D intake and regular weightbearing exercise       Hearing Screening    500Hz 1000Hz 2000Hz 4000Hz   Right ear 0 0 0 0   Left ear 0 0 0 0   Vision Screening - Comments[de-identified] Sees an eye doc regularly, declines screening  Physical Exam:     /82 (BP Location: Left arm, Patient Position: Sitting, Cuff Size: Large)   Pulse 92   Temp 98 8 °F (37 1 °C)   Resp 16   Ht 5' 6" (1 676 m)   Wt 83 kg (183 lb)   SpO2 97%   BMI 29 54 kg/m²     Physical Exam  Vitals and nursing note reviewed  Constitutional:       General: She is not in acute distress  Appearance: Normal appearance  She is well-developed  She is not ill-appearing  HENT:      Head: Normocephalic and atraumatic  Right Ear: There is impacted cerumen  Left Ear: Tympanic membrane, ear canal and external ear normal       Nose: Nose normal       Mouth/Throat:      Mouth: Mucous membranes are moist       Pharynx: Oropharynx is clear  Eyes:      General:         Right eye: No discharge  Left eye: No discharge  Conjunctiva/sclera: Conjunctivae normal       Pupils: Pupils are equal, round, and reactive to light  Cardiovascular:      Rate and Rhythm: Normal rate and regular rhythm  Pulses: Normal pulses  Heart sounds: Normal heart sounds  Pulmonary:      Effort: Pulmonary effort is normal  No respiratory distress  Breath sounds: Normal breath sounds  No stridor  No wheezing, rhonchi or rales  Abdominal:      General: Abdomen is flat  Bowel sounds are normal       Palpations: Abdomen is soft  Tenderness: There is no abdominal tenderness  Musculoskeletal:         General: Normal range of motion  Cervical back: Normal range of motion and neck supple  Right lower leg: No edema  Left lower leg: No edema  Skin:     General: Skin is warm and dry  Capillary Refill: Capillary refill takes less than 2 seconds  Neurological:      General: No focal deficit present  Mental Status: She is alert and oriented to person, place, and time  Mental status is at baseline     Psychiatric:         Mood and Affect: Mood normal          Behavior: Behavior normal          Thought Content:  Thought content normal           Molly Douglas

## 2023-03-25 LAB
HIV 1+2 AB+HIV1 P24 AG SERPL QL IA: NORMAL
HIV 2 AB SERPL QL IA: NORMAL
HIV1 AB SERPL QL IA: NORMAL
HIV1 P24 AG SERPL QL IA: NORMAL

## 2023-03-30 ENCOUNTER — HOSPITAL ENCOUNTER (OUTPATIENT)
Dept: RADIOLOGY | Facility: MEDICAL CENTER | Age: 63
Discharge: HOME | End: 2023-03-30

## 2023-03-30 DIAGNOSIS — E78.2 MIXED HYPERLIPIDEMIA: Primary | ICD-10-CM

## 2023-03-30 DIAGNOSIS — E83.52 SERUM CALCIUM ELEVATED: ICD-10-CM

## 2023-03-30 RX ORDER — ROSUVASTATIN CALCIUM 10 MG/1
10 TABLET, COATED ORAL DAILY
Qty: 90 TABLET | Refills: 1 | Status: SHIPPED | OUTPATIENT
Start: 2023-03-30

## 2023-05-11 ENCOUNTER — PREP FOR PROCEDURE (OUTPATIENT)
Dept: GASTROENTEROLOGY | Facility: CLINIC | Age: 63
End: 2023-05-11

## 2023-05-11 ENCOUNTER — TELEPHONE (OUTPATIENT)
Dept: GASTROENTEROLOGY | Facility: CLINIC | Age: 63
End: 2023-05-11

## 2023-05-11 DIAGNOSIS — Z86.010 HISTORY OF COLON POLYPS: ICD-10-CM

## 2023-05-11 DIAGNOSIS — Z12.11 SCREENING FOR COLON CANCER: Primary | ICD-10-CM

## 2023-05-11 NOTE — TELEPHONE ENCOUNTER
Called patient from a referral to schedule a screening colonoscopy - reviewed and My Charted prep Instructions    Scheduled date of colonoscopy (as of today):8/8/23  Physician performing colonoscopy:Geovanna  Location of colonoscopy:[place]  Bowel prep reviewed with patient:Jack/miralax  Instructions reviewed with patient by:Harry mcmahon  Clearances: none

## 2023-05-17 ENCOUNTER — HOSPITAL ENCOUNTER (OUTPATIENT)
Dept: RADIOLOGY | Facility: MEDICAL CENTER | Age: 63
Discharge: HOME | End: 2023-05-17

## 2023-05-17 DIAGNOSIS — Z78.0 POSTMENOPAUSAL: ICD-10-CM

## 2023-05-17 DIAGNOSIS — Z13.820 SCREENING FOR OSTEOPOROSIS: ICD-10-CM

## 2023-08-02 ENCOUNTER — RA CDI HCC (OUTPATIENT)
Dept: OTHER | Facility: HOSPITAL | Age: 63
End: 2023-08-02

## 2023-08-02 NOTE — PROGRESS NOTES
[address] [place] coding opportunities       Chart reviewed, no opportunity found: CHART REVIEWED, NO OPPORTUNITY FOUND        Patients Insurance     Medicare Insurance: Medicare

## 2023-08-08 ENCOUNTER — HOSPITAL ENCOUNTER (OUTPATIENT)
Dept: GASTROENTEROLOGY | Facility: HOSPITAL | Age: 63
Setting detail: OUTPATIENT SURGERY
Discharge: HOME | End: 2023-08-08
Attending: INTERNAL MEDICINE
Payer: MEDICARE

## 2023-08-08 ENCOUNTER — ANESTHESIA EVENT (OUTPATIENT)
Dept: GASTROENTEROLOGY | Facility: HOSPITAL | Age: 63
End: 2023-08-08

## 2023-08-08 ENCOUNTER — ANESTHESIA (OUTPATIENT)
Dept: GASTROENTEROLOGY | Facility: HOSPITAL | Age: 63
End: 2023-08-08

## 2023-08-08 VITALS
TEMPERATURE: 98 F | HEART RATE: 76 BPM | RESPIRATION RATE: 18 BRPM | WEIGHT: 182.1 LBS | DIASTOLIC BLOOD PRESSURE: 87 MMHG | OXYGEN SATURATION: 99 % | BODY MASS INDEX: 29.27 KG/M2 | HEIGHT: 66 IN | SYSTOLIC BLOOD PRESSURE: 139 MMHG

## 2023-08-08 DIAGNOSIS — Z86.010 HISTORY OF COLON POLYPS: ICD-10-CM

## 2023-08-08 DIAGNOSIS — Z12.11 SCREENING FOR COLON CANCER: ICD-10-CM

## 2023-08-08 PROCEDURE — 88305 TISSUE EXAM BY PATHOLOGIST: CPT | Performed by: PATHOLOGY

## 2023-08-08 RX ORDER — SODIUM CHLORIDE, SODIUM LACTATE, POTASSIUM CHLORIDE, CALCIUM CHLORIDE 600; 310; 30; 20 MG/100ML; MG/100ML; MG/100ML; MG/100ML
INJECTION, SOLUTION INTRAVENOUS CONTINUOUS PRN
Status: DISCONTINUED | OUTPATIENT
Start: 2023-08-08 | End: 2023-08-08

## 2023-08-08 RX ORDER — LIDOCAINE HYDROCHLORIDE 20 MG/ML
INJECTION, SOLUTION EPIDURAL; INFILTRATION; INTRACAUDAL; PERINEURAL AS NEEDED
Status: DISCONTINUED | OUTPATIENT
Start: 2023-08-08 | End: 2023-08-08

## 2023-08-08 RX ORDER — PROPOFOL 10 MG/ML
INJECTION, EMULSION INTRAVENOUS AS NEEDED
Status: DISCONTINUED | OUTPATIENT
Start: 2023-08-08 | End: 2023-08-08

## 2023-08-08 RX ADMIN — SODIUM CHLORIDE, SODIUM LACTATE, POTASSIUM CHLORIDE, AND CALCIUM CHLORIDE: .6; .31; .03; .02 INJECTION, SOLUTION INTRAVENOUS at 09:35

## 2023-08-08 RX ADMIN — PROPOFOL 100 MG: 10 INJECTION, EMULSION INTRAVENOUS at 10:14

## 2023-08-08 RX ADMIN — PROPOFOL 40 MG: 10 INJECTION, EMULSION INTRAVENOUS at 10:06

## 2023-08-08 RX ADMIN — LIDOCAINE HYDROCHLORIDE 100 MG: 20 INJECTION, SOLUTION EPIDURAL; INFILTRATION; INTRACAUDAL; PERINEURAL at 10:04

## 2023-08-08 RX ADMIN — PROPOFOL 40 MG: 10 INJECTION, EMULSION INTRAVENOUS at 10:10

## 2023-08-08 RX ADMIN — PROPOFOL 120 MG: 10 INJECTION, EMULSION INTRAVENOUS at 10:04

## 2023-08-08 NOTE — ANESTHESIA PREPROCEDURE EVALUATION
Procedure:  COLONOSCOPY    Relevant Problems   CARDIO   (+) Breast pain   (+) Hyperlipidemia      GI/HEPATIC   (+) Acid reflux      MUSCULOSKELETAL   (+) Cervical arthritis      NEURO/PSYCH   (+) Anxiety   (+) Current severe episode of major depressive disorder without 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 episode [facility])        Physical Exam    Airway    Mallampati score: I  TM Distance: >3 FB  Neck ROM: full     Dental       Cardiovascular  Cardiovascular exam normal    Pulmonary  Pulmonary exam normal     Other Findings        Anesthesia Plan  ASA Score- 2     Anesthesia Type- IV sedation with anesthesia with ASA Monitors. Additional Monitors:   Airway Plan:           Plan Factors-Exercise tolerance (METS): >4 METS. Chart reviewed. EKG reviewed. Imaging results reviewed. Existing labs reviewed. Patient summary reviewed. Induction- intravenous. Postoperative Plan- Plan for postoperative opioid use. Planned trial extubation    Informed Consent- Anesthetic plan and risks discussed with patient. I personally reviewed this patient with the CRNA. Discussed and agreed on the Anesthesia Plan with the CRNA. Chrissy Almeida

## 2023-08-08 NOTE — ANESTHESIA POSTPROCEDURE EVALUATION
Post-Op Assessment Note    CV Status:  Stable    Pain management: adequate     Mental Status:  Alert and awake   Hydration Status:  Euvolemic   PONV Controlled:  Controlled   Airway Patency:  Patent      Post Op Vitals Reviewed: Yes      Staff: Anesthesiologist, CRNA         There were no known notable events for this encounter.     /78 (08/08/23 1028)    Temp 98 °F (36.7 °C) (08/08/23 1028)    Pulse 73 (08/08/23 1028)   Resp 16 (08/08/23 1028)    SpO2 96 % (08/08/23 1028)

## 2023-08-08 NOTE — H&P
History and Physical - [facility]  Rowna Mason 70 y.o. female MRN: [id number]      HPI: Rowan Mason is a 70y.o. year old female who presents for screening colonoscopy      REVIEW OF SYSTEMS: Per the HPI, and otherwise unremarkable.     Historical Information   Past Medical History:   Diagnosis Date   • Anxiety    • Anxiety and depression    • Bowel disease    • Chronic obstructive lung disease ([address])    • Depression    • Hypercholesterolemia    • Hyperlipidemia    • IBS (irritable bowel syndrome)    • Shortness of breath    • Sleep disturbance    • Urinary incontinence    • Vocal cord polyp      Past Surgical History:   Procedure Laterality Date   • BREAST CYST EXCISION Left 1989    Benign   • BUNIONECTOMY Left 9/10/2021    Procedure: OSTEOTOMY OF PROXIMAL PHALAX GREAT TOE;  Surgeon: Esequiel Romberg, DPM;  [facility];  Service: Podiatry   • CHOLECYSTECTOMY     • CYST REMOVAL      on back   • IN CORRJ HALLUX VALGUS W/SESMDC W/DIST [place] Left 9/10/2021    Procedure: BUNIONECTOMY JAIDA FOOT;  Surgeon: Esequiel Romberg, DPM;  [facility];  Service: Podiatry   • RHINOPLASTY     • US GUIDED THYROID BIOPSY  5/15/2019     Social History   Social History     Substance and Sexual Activity   Alcohol Use Yes   • Alcohol/week: 2.0 standard drinks of alcohol   • Types: 2 Glasses of wine per week    Comment: weekly     Social History     Substance and Sexual Activity   Drug Use No     Social History     Tobacco Use   Smoking Status Every Day   • Packs/day: 1.50   • Types: Cigarettes   • Start date: 1975   Smokeless Tobacco Never   Tobacco Comments    last time 9/10/2021     Family History   Problem Relation Age of Onset   • Hypertension Mother    • Varicose Veins Mother    • Hypertension Father    • Skin cancer Father    • Diabetes Sister    • Thyroid disease Sister    • No Known Problems Daughter    • Ovarian cancer Maternal Grandmother 80   • No Known Problems Paternal Grandmother    • Breast cancer Maternal Aunt 39   • Thyroid cancer Maternal Aunt    • Thyroid disease Maternal Aunt    • Skin cancer Brother        Meds/Allergies     (Not in a hospital admission)      Allergies   Allergen Reactions   • Amoxicillin-Pot Clavulanate Rash   • Iodinated Contrast Media Hives and Itching       Objective     Blood pressure 137/73, pulse 80, temperature 97.6 °F (36.4 °C), temperature source Temporal, resp. rate 20, height 5' 6" (1.676 m), weight 82.6 kg (182 lb 1.6 oz), SpO2 100 %, not currently breastfeeding. PHYSICAL EXAM    Gen: NAD  CV: RRR  CHEST: Clear  ABD: soft, NT/ND  EXT: no edema      ASSESSMENT/PLAN:  This is a 70y.o. year old female here for colonoscopy, and she is stable and optimized for her procedure.

## 2023-08-09 ENCOUNTER — OFFICE VISIT (OUTPATIENT)
Dept: FAMILY MEDICINE CLINIC | Facility: MEDICAL CENTER | Age: 63
End: 2023-08-09
Payer: MEDICARE

## 2023-08-09 VITALS
OXYGEN SATURATION: 97 % | BODY MASS INDEX: 29.63 KG/M2 | WEIGHT: 184.4 LBS | HEART RATE: 82 BPM | HEIGHT: 66 IN | DIASTOLIC BLOOD PRESSURE: 84 MMHG | SYSTOLIC BLOOD PRESSURE: 144 MMHG

## 2023-08-09 DIAGNOSIS — Z72.0 TOBACCO USE: Primary | ICD-10-CM

## 2023-08-09 DIAGNOSIS — F32.2 CURRENT SEVERE EPISODE OF MAJOR DEPRESSIVE DISORDER WITHOUT PSYCHOTIC FEATURES WITHOUT PRIOR EPISODE (HCC): ICD-10-CM

## 2023-08-09 PROCEDURE — 99214 OFFICE O/P EST MOD 30 MIN: CPT

## 2023-08-09 RX ORDER — BUPROPION HYDROCHLORIDE 150 MG/1
150 TABLET ORAL EVERY MORNING
Qty: 90 TABLET | Refills: 1 | Status: SHIPPED | OUTPATIENT
Start: 2023-08-09 | End: 2023-11-07

## 2023-08-09 NOTE — PROGRESS NOTES
Assessment/Plan:    Continue Wellbutrin  mg daily for both depression and smoking cessation. Return in 3 months for follow-up, sooner if needed. 1. Tobacco use  Since starting Wellbutrin, patient has cut back significantly on smoking from 1.5 PPD to 0.5 PPD. Would like to continue Wellbutrin at this time mainly for depression as well as smoking cessation as she plans to continue to try and quit. Highly encouraged to quit smoking. 2. Current severe episode of major depressive disorder without 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 episode (HCC)  Continue Wellbutrin 150 mg daily. PHQ-9  Follow-up in 3 months. - buPROPion (WELLBUTRIN XL) 150 mg 24 hr tablet; Take 1 tablet (150 mg total) by mouth every morning  Dispense: 90 tablet; Refill: 1      Subjective:      Patient ID: Ronak Pepe is a 70 y.o. female. 26-year-old female presents for follow-up smoking cessation. She was placed on Wellbutrin at her last office visit. She tells me she is still taking the medication, has only been taking it once per day and she is still smoking however she has cut back on her smoking from 1.5 packs to 0.5 ppd. She also thinks most of the reason she cut back is because her grandchildren and daughter are living with her. However, she does smoke more when she is alone. She does also feel much better on this medication from her depression symptom standpoint, sleep has improved. She is aware she needs to quit smoking, still plans on trying to and would like to remain on the medication once daily mainly for depression. She offers no other concerns or complaints at this time. She tells me she thinks her BP is slightly elevated today as yesterday she had a colonoscopy, has not yet had a bowel movement and drink 5 cups of coffee this morning to help aide her with BM.       The following portions of the patient's history were reviewed and updated as appropriate: allergies, current medications, past family history, past social history, past surgical history and problem list.    Review of Systems   Constitutional: Negative. HENT: Negative. Eyes: Negative. Respiratory: Negative. Cardiovascular: Negative. Gastrointestinal: Negative. Endocrine: Negative. Genitourinary: Negative. Musculoskeletal: Negative. Skin: Negative. Allergic/Immunologic: Negative. Neurological: Negative. Hematological: Negative. Psychiatric/Behavioral: Negative. Objective:      /84 (BP Location: Left arm, Patient Position: Sitting, Cuff Size: Large)   Pulse 82   Ht 5' 6" (1.676 m)   Wt 83.6 kg (184 lb 6.4 oz)   SpO2 97%   BMI 29.76 kg/m²          Physical Exam  Vitals and nursing note reviewed. Constitutional:       General: She is not in acute distress. Appearance: Normal appearance. She is not ill-appearing. HENT:      Head: Normocephalic and atraumatic. Cardiovascular:      Rate and Rhythm: Normal rate. Pulses: Normal pulses. Pulmonary:      Effort: Pulmonary effort is normal. No respiratory distress. Musculoskeletal:         General: Normal range of motion. Cervical back: Normal range of motion. Skin:     General: Skin is warm and dry. Neurological:      General: No focal deficit present. Mental Status: She is alert and oriented to person, place, and time. Mental status is at baseline. Psychiatric:         Mood and Affect: Mood normal.         Behavior: Behavior normal.         Thought Content:  Thought content normal.         PHQ-2/9 Depression Screening    Little interest or pleasure in doing things: 1 - several days  Feeling down, depressed, or hopeless: 1 - several days  Trouble falling or staying asleep, or sleeping too much: 0 - not at all  Feeling tired or having little energy: 2 - more than half the days  Poor appetite or overeatin - several days  Feeling bad about yourself - or that you are a failure or have let yourself or your family down: 1 - several days  Trouble concentrating on things, such as reading the newspaper or watching television: 1 - several days  Moving or speaking so slowly that other people could have noticed. Or the opposite - being so fidgety or restless that you have been moving around a lot more than usual: 2 - more than half the days  Thoughts that you would be better off dead, or of hurting yourself in some way: 0 - not at all  PHQ-9 Score: 9   PHQ-9 Interpretation: Mild depression      Depression Screening Follow-up Plan: Patient's depression screening was positive with a PHQ-2 score of . Their PHQ-9 score was 9. Patient assessed for underlying major depression. They have no active suicidal ideations. Brief counseling provided and recommend additional follow-up/re-evaluation next office visit. History of depression and anxiety. Reports improvement in symptoms since starting Wellbutrin for smoking cessation. Will have patient continue daily and follow-up in 3 months, sooner if needed.            NABOR Ramey

## 2023-08-10 PROCEDURE — 88305 TISSUE EXAM BY PATHOLOGIST: CPT | Performed by: PATHOLOGY

## 2023-10-30 ENCOUNTER — NURSE TRIAGE (OUTPATIENT)
Age: 63
End: 2023-10-30

## 2023-10-30 NOTE — TELEPHONE ENCOUNTER
Reason for Disposition   Back pain lasts > 2 weeks    Answer Assessment - Initial Assessment Questions  1. ONSET: "When did the pain begin?"       3 weeks   2. LOCATION: "Where does it hurt?" (upper, mid or lower back)      Upper mid posterior rib area  3. SEVERITY: "How bad is the pain?"  (e.g., Scale 1-10; mild, moderate, or severe)    - MILD (1-3): doesn't interfere with normal activities     - MODERATE (4-7): interferes with normal activities or awakens from sleep     - SEVERE (8-10): excruciating pain, unable to do any normal activities       Mild to moderate  4. PATTERN: "Is the pain constant?" (e.g., yes, no; constant, intermittent)       constant  5. RADIATION: "Does the pain shoot into your legs or elsewhere?"      Denies   6. CAUSE:  "What do you think is causing the back pain?"       unknown  7. BACK OVERUSE:  "Any recent lifting of heavy objects, strenuous work or exercise?"      Unknown   8. MEDICATIONS: "What have you taken so far for the pain?" (e.g., nothing, acetaminophen, NSAIDS)      Did not take anything   9. NEUROLOGIC SYMPTOMS: "Do you have any weakness, numbness, or problems with bowel/bladder control?"      Denies   10.  OTHER SYMPTOMS: "Do you have any other symptoms?" (e.g., fever, abdominal pain, burning with urination, blood in urine)        Denies    Protocols used: Back Pain-ADULT-[place]

## 2023-11-01 ENCOUNTER — TELEPHONE (OUTPATIENT)
Age: 63
End: 2023-11-01

## 2023-11-01 ENCOUNTER — OFFICE VISIT (OUTPATIENT)
Dept: FAMILY MEDICINE CLINIC | Facility: MEDICAL CENTER | Age: 63
End: 2023-11-01
Payer: MEDICARE

## 2023-11-01 VITALS
DIASTOLIC BLOOD PRESSURE: 82 MMHG | HEIGHT: 66 IN | OXYGEN SATURATION: 96 % | BODY MASS INDEX: 29.86 KG/M2 | TEMPERATURE: 98.6 F | WEIGHT: 185.8 LBS | HEART RATE: 80 BPM | SYSTOLIC BLOOD PRESSURE: 140 MMHG

## 2023-11-01 DIAGNOSIS — T14.8XXA MUSCLE STRAIN: Primary | ICD-10-CM

## 2023-11-01 PROCEDURE — 99213 OFFICE O/P EST LOW 20 MIN: CPT

## 2023-11-01 RX ORDER — LIDOCAINE 50 MG/G
1 PATCH TOPICAL DAILY
Qty: 15 PATCH | Refills: 0 | Status: SHIPPED | OUTPATIENT
Start: 2023-11-01

## 2023-11-01 RX ORDER — MELOXICAM 15 MG/1
15 TABLET ORAL DAILY PRN
Qty: 30 TABLET | Refills: 0 | Status: SHIPPED | OUTPATIENT
Start: 2023-11-01

## 2023-11-01 NOTE — PATIENT INSTRUCTIONS
Take Meloxicam once daily as directed over the next 2-4 weeks. Avoid any additional NSAIDs while taking this. Tylenol is ok. Use lidoderm patch every 12 hours as needed. Use ice or heat to the area. Light stretches are ok, avoid heavy lifting and triggers. Call if worsening or no improvement.

## 2023-11-01 NOTE — TELEPHONE ENCOUNTER
I anticipated the Lidoderm patch potentially being denied by patient's insurance coverage. I did advise patient to purchase over-the-counter lidocaine patches 4% in the event the prescription dose was not covered.

## 2023-11-01 NOTE — PROGRESS NOTES
Assessment/Plan:     1. Muscle strain  Meloxicam once daily over the next aches. Avoid additional NSAID use while taking this medication. Tylenol may be taken in addition to this as needed. Lidocaine patch to affected area as directed, advised patient to get OTC lidocaine patch 4% if 5% patch is not covered by insurance. May also try ice or heat to the area for pain relief. Avoid heavy lifting or any additional potential triggers. If pain worsens or persists, follow-up will be needed. - lidocaine (Lidoderm) 5 %; Apply 1 patch topically over 12 hours daily Remove & Discard patch within 12 hours or as directed by MD  Dispense: 15 patch; Refill: 0  - meloxicam (MOBIC) 15 mg tablet; Take 1 tablet (15 mg total) by mouth daily as needed for moderate pain  Dispense: 30 tablet; Refill: 0      Subjective:      Patient ID: Georgie Borrero is a 70 y.o. female. 70year old female presents with complaint of left mid back pain at bra line that started 1.5 months ago which was sporadic however is now bothersome daily, worse in the morning and then becomes intermittent throughout the day. Pain is worse with certain movements. She recently started going to the [facility] 2 weeks ago regularly and has been able to do the exercises but is worse with certain movements. She has also been picking up her grandchildren more now that they live with her. Denies injury/falls. Denies ecchymosis, erythema, rash. Denies cp, sob. She has not tried anything otc as of yet for her symptoms. The following portions of the patient's history were reviewed and updated as appropriate: allergies, current medications, past medical history, past social history, past surgical history, and problem list.    Review of Systems   Constitutional: Negative. HENT: Negative. Eyes: Negative. Respiratory: Negative. Cardiovascular: Negative. Gastrointestinal: Negative. Endocrine: Negative. Genitourinary: Negative.     Musculoskeletal: Positive for back pain. Skin: Negative. Allergic/Immunologic: Negative. Neurological: Negative. Hematological: Negative. Psychiatric/Behavioral: Negative. Objective:      /82 (BP Location: Left arm, Patient Position: Sitting, Cuff Size: Large)   Pulse 80   Temp 98.6 °F (37 °C)   Ht 5' 6" (1.676 m)   Wt 84.3 kg (185 lb 12.8 oz)   SpO2 96%   BMI 29.99 kg/m²          Physical Exam  Vitals and nursing note reviewed. Constitutional:       General: She is not in acute distress. Appearance: Normal appearance. She is not ill-appearing. HENT:      Head: Normocephalic and atraumatic. Eyes:      Conjunctiva/sclera: Conjunctivae normal.   Cardiovascular:      Rate and Rhythm: Normal rate and regular rhythm. Pulses: Normal pulses. Heart sounds: Normal heart sounds. Pulmonary:      Effort: Pulmonary effort is normal.      Breath sounds: Normal breath sounds. Musculoskeletal:      Cervical back: Normal and normal range of motion. Thoracic back: Normal.      Lumbar back: Normal.        Back:    Skin:     General: Skin is warm and dry. Findings: No erythema or rash. Neurological:      General: No focal deficit present. Mental Status: She is alert and oriented to person, place, and time. Mental status is at baseline. Psychiatric:         Mood and Affect: Mood normal.         Behavior: Behavior normal.         Thought Content:  Thought content normal.                    Vijaya Enriquez

## 2023-11-02 ENCOUNTER — RA CDI HCC (OUTPATIENT)
Dept: OTHER | Facility: HOSPITAL | Age: 63
End: 2023-11-02

## 2023-11-02 NOTE — PROGRESS NOTES
[address] [facility] coding opportunities       Chart reviewed, no opportunity found: CHART REVIEWED, NO OPPORTUNITY FOUND        Patients Insurance     Medicare Insurance: Medicare

## 2024-01-08 DIAGNOSIS — E78.2 MIXED HYPERLIPIDEMIA: ICD-10-CM

## 2024-01-08 NOTE — PROGRESS NOTES
Affect sad with anxious mood. Out in milieu socializing with peers. Attending groups with participation. Denies suicidal and homicidal ideation and auditory and visual hallucinations. Denies paranoia. Voiced sadness and frustration that she is hospitalized for statement that was not really stated. 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 Denies signs or sx of Covid.   HEARING EVALUATION     Name:  Bakari Mcleod  :  1960  Age:  62 y o  Date of Evaluation: 19      History: exam required for employment  Reason for visit: Bakari Mcleod is being seen today at the request of Dr Tru Bullock for an evaluation of hearing  Patient is a  and must provide a completed hearing test to her employer  The patient has hearing aids which she acquired at [facility], and states her left ear is worse than her right ear           EVALUATION:     Otoscopic Evaluation:              Right Ear: Minimum cerumen               Left Ear: Minimum cerumen      Tympanometry:              Right: Type A - normal middle ear pressure and compliance              Left: Type A - normal middle ear pressure and compliance     Audiogram Results:  Right ear: Moderately severe to severe hearing loss  WRS is 100%  Left ear:  Severe hearing loss  WRS is 68%      Aided responses per [place] DOT  form:     Aided Responses in the Soundfield:              500 Hz warble tone:  50 dB             1,000 Hz warble tone:  40dB             2,000 Hz warble tone:  40dB     Aided Speech Recognition scores:              Right speaker:  40dB     Aided Word Recognition scores (presentation level 50dB, 38dB speech noise presented to opposite ear):              Right:  96%              Left:  80%                 *see attached audiogram        Speechmapping revealed the right hearing aid to be overamplified and the left hearing aid to be under amplified in the high frequencies  ANSI standards were not available for this hearing aid      RECOMMENDATIONS:  Annual hearing eval and Copy to Patient/Caregiver     PATIENT EDUCATION:   Discussed results and recommendations with patient   Questions were addressed and the patient was encouraged to contact our department should concerns arise         Marian Vaca    Clinical Audiologist

## 2024-01-09 RX ORDER — ROSUVASTATIN CALCIUM 10 MG/1
10 TABLET, COATED ORAL DAILY
Qty: 90 TABLET | Refills: 1 | Status: SHIPPED | OUTPATIENT
Start: 2024-01-09

## 2024-01-31 DIAGNOSIS — F32.2 CURRENT SEVERE EPISODE OF MAJOR DEPRESSIVE DISORDER WITHOUT PSYCHOTIC FEATURES WITHOUT PRIOR EPISODE (HCC): ICD-10-CM

## 2024-01-31 RX ORDER — BUPROPION HYDROCHLORIDE 150 MG/1
150 TABLET ORAL EVERY MORNING
Qty: 90 TABLET | Refills: 1 | Status: SHIPPED | OUTPATIENT
Start: 2024-01-31 | End: 2024-02-01

## 2024-01-31 NOTE — TELEPHONE ENCOUNTER
Reason for call:   [x] Refill   [] Prior Auth  [] Other:     Office:   [x] PCP/Provider -   [] Specialty/Provider -     Medication: bupropion 150 mg, one tab daily. 90 tabs     [facility]    Does the patient have enough for 3 days?   [] Yes   [x] No - Send as HP to POD

## 2024-02-01 DIAGNOSIS — F32.2 CURRENT SEVERE EPISODE OF MAJOR DEPRESSIVE DISORDER WITHOUT PSYCHOTIC FEATURES WITHOUT PRIOR EPISODE (HCC): ICD-10-CM

## 2024-02-01 RX ORDER — BUPROPION HYDROCHLORIDE 150 MG/1
150 TABLET ORAL DAILY
Qty: 90 TABLET | Refills: 1 | Status: SHIPPED | OUTPATIENT
Start: 2024-02-01

## 2024-02-21 PROBLEM — Z01.419 ENCOUNTER FOR GYNECOLOGICAL EXAMINATION (GENERAL) (ROUTINE) WITHOUT ABNORMAL FINDINGS: Status: RESOLVED | Noted: 2021-11-15 | Resolved: 2024-02-21

## 2024-07-08 DIAGNOSIS — E78.2 MIXED HYPERLIPIDEMIA: ICD-10-CM

## 2024-07-08 RX ORDER — ROSUVASTATIN CALCIUM 10 MG/1
10 TABLET, COATED ORAL DAILY
Qty: 30 TABLET | Refills: 0 | Status: SHIPPED | OUTPATIENT
Start: 2024-07-08

## 2024-08-28 ENCOUNTER — RA CDI HCC (OUTPATIENT)
Dept: OTHER | Facility: HOSPITAL | Age: 64
End: 2024-08-28

## 2024-09-05 ENCOUNTER — OFFICE VISIT (OUTPATIENT)
Dept: FAMILY MEDICINE CLINIC | Facility: MEDICAL CENTER | Age: 64
End: 2024-09-05
Payer: COMMERCIAL

## 2024-09-05 VITALS
BODY MASS INDEX: 28.93 KG/M2 | SYSTOLIC BLOOD PRESSURE: 140 MMHG | DIASTOLIC BLOOD PRESSURE: 90 MMHG | RESPIRATION RATE: 18 BRPM | HEIGHT: 66 IN | OXYGEN SATURATION: 98 % | WEIGHT: 180 LBS | HEART RATE: 95 BPM | TEMPERATURE: 97.4 F

## 2024-09-05 DIAGNOSIS — Z12.2 SCREENING FOR LUNG CANCER: ICD-10-CM

## 2024-09-05 DIAGNOSIS — F32.2 CURRENT SEVERE EPISODE OF MAJOR DEPRESSIVE DISORDER WITHOUT PSYCHOTIC FEATURES WITHOUT PRIOR EPISODE (HCC): Primary | ICD-10-CM

## 2024-09-05 DIAGNOSIS — Z87.891 PERSONAL HISTORY OF NICOTINE DEPENDENCE: ICD-10-CM

## 2024-09-05 DIAGNOSIS — E04.1 THYROID NODULE: ICD-10-CM

## 2024-09-05 DIAGNOSIS — E78.2 MIXED HYPERLIPIDEMIA: ICD-10-CM

## 2024-09-05 PROBLEM — G47.00 INSOMNIA: Status: RESOLVED | Noted: 2019-09-03 | Resolved: 2024-09-05

## 2024-09-05 PROBLEM — M21.612 BUNION, LEFT: Status: RESOLVED | Noted: 2021-08-27 | Resolved: 2024-09-05

## 2024-09-05 PROCEDURE — 1159F MED LIST DOCD IN RCRD: CPT

## 2024-09-05 PROCEDURE — 3725F SCREEN DEPRESSION PERFORMED: CPT

## 2024-09-05 PROCEDURE — 3288F FALL RISK ASSESSMENT DOCD: CPT

## 2024-09-05 PROCEDURE — 99214 OFFICE O/P EST MOD 30 MIN: CPT

## 2024-09-05 PROCEDURE — 1160F RVW MEDS BY RX/DR IN RCRD: CPT

## 2024-09-05 PROCEDURE — 1100F PTFALLS ASSESS-DOCD GE2>/YR: CPT

## 2024-09-05 RX ORDER — ROSUVASTATIN CALCIUM 10 MG/1
10 TABLET, COATED ORAL DAILY
Qty: 90 TABLET | Refills: 1 | Status: SHIPPED | OUTPATIENT
Start: 2024-09-05

## 2024-09-05 RX ORDER — BUPROPION HYDROCHLORIDE 150 MG/1
150 TABLET ORAL DAILY
Qty: 90 TABLET | Refills: 1 | Status: SHIPPED | OUTPATIENT
Start: 2024-09-05

## 2024-09-05 NOTE — PROGRESS NOTES
Assessment/Plan:    Return with nurse for influenza vaccine next month.  Return for annual Medicare wellness visit, overdue.  Advised about Shingrix vaccine.  Advised about COVID booster.  Encouraged to quit smoking.  Lung CT screening order placed and provided to patient.     1. Current severe episode of major depressive disorder without 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 episode (HCC)  Stable with current management.  Continue Wellbutrin, refilled.  - buPROPion (WELLBUTRIN XL) 150 mg 24 hr tablet; Take 1 tablet (150 mg total) by mouth daily  Dispense: 90 tablet; Refill: 1    2. Mixed hyperlipidemia  Due for lipid panel, order placed.  Continue current dose of rosuvastatin, refilled.  - rosuvastatin (CRESTOR) 10 MG tablet; Take 1 tablet (10 mg total) by mouth daily  Dispense: 90 tablet; Refill: 1  - Comprehensive metabolic panel; Future  - Lipid panel; Future    3. Thyroid nodule  Biopsy in 2019 returned negative.    4. Screening for lung cancer  - CT lung screening program; Future    5. Personal history of nicotine dependence  - CT lung screening program; Future      Subjective:      Patient ID: Allyson Nagy is a 64 y.o. female.    64-year-old female presents for follow-up. She has depression and anxiety, reports well controlled with Wellbutrin and does need a refill today for this.  She has hyperlipidemia, currently on rosuvastatin and tolerating well.  She is now retired, helps to care for her grandchildren.  She is still smoking about three-quarter pack per day and is due for lung CT screening.  She offers no concerns or complaints at this time.          The following portions of the patient's history were reviewed and updated as appropriate: current medications, past family history, past medical history, past social history, past surgical history, and problem list.    Review of Systems   Constitutional: Negative.    HENT: Negative.     Eyes: Negative.    Respiratory: Negative.     Cardiovascular: Negative.   "  Gastrointestinal: Negative.    Endocrine: Negative.    Genitourinary: Negative.    Musculoskeletal: Negative.    Skin: Negative.    Allergic/Immunologic: Negative.    Neurological: Negative.    Hematological: Negative.    Psychiatric/Behavioral: Negative.           Objective:      /90 (BP Location: Left arm, Patient Position: Sitting, Cuff Size: Large)   Pulse 95   Temp (!) 97.4 °F (36.3 °C) (Temporal)   Resp 18   Ht 5' 6\" (1.676 m)   Wt 81.6 kg (180 lb)   SpO2 98%   BMI 29.05 kg/m²          Physical Exam  Vitals and nursing note reviewed.   Constitutional:       General: She is not in acute distress.     Appearance: Normal appearance. She is not ill-appearing.   HENT:      Head: Normocephalic and atraumatic.   Cardiovascular:      Rate and Rhythm: Normal rate and regular rhythm.      Pulses: Normal pulses.      Heart sounds: Normal heart sounds.   Pulmonary:      Effort: Pulmonary effort is normal.      Breath sounds: Normal breath sounds.   Neurological:      General: No focal deficit present.      Mental Status: She is alert and oriented to person, place, and time. Mental status is at baseline.   Psychiatric:         Mood and Affect: Mood normal.         Behavior: Behavior normal.         Thought Content: Thought content normal.                    NABOR Nix  "

## 2024-09-18 ENCOUNTER — HOSPITAL ENCOUNTER (OUTPATIENT)
Dept: RADIOLOGY | Facility: MEDICAL CENTER | Age: 64
Discharge: HOME | End: 2024-09-18
Payer: COMMERCIAL

## 2024-09-18 ENCOUNTER — APPOINTMENT (OUTPATIENT)
Dept: LAB | Facility: MEDICAL CENTER | Age: 64
End: 2024-09-18
Payer: COMMERCIAL

## 2024-09-18 DIAGNOSIS — E78.2 MIXED HYPERLIPIDEMIA: ICD-10-CM

## 2024-09-18 DIAGNOSIS — Z12.2 SCREENING FOR LUNG CANCER: ICD-10-CM

## 2024-09-18 DIAGNOSIS — Z87.891 PERSONAL HISTORY OF NICOTINE DEPENDENCE: ICD-10-CM

## 2024-09-18 LAB
ALBUMIN SERPL BCG-MCNC: 4.1 G/DL (ref 3.5–5)
ALP SERPL-CCNC: 56 U/L (ref 34–104)
ALT SERPL W P-5'-P-CCNC: 23 U/L (ref 7–52)
ANION GAP SERPL CALCULATED.3IONS-SCNC: 10 MMOL/L (ref 4–13)
AST SERPL W P-5'-P-CCNC: 24 U/L (ref 13–39)
BILIRUB SERPL-MCNC: 0.55 MG/DL (ref 0.2–1)
BUN SERPL-MCNC: 12 MG/DL (ref 5–25)
CALCIUM SERPL-MCNC: 9.4 MG/DL (ref 8.4–10.2)
CHLORIDE SERPL-SCNC: 104 MMOL/L (ref 96–108)
CHOLEST SERPL-MCNC: 198 MG/DL
CO2 SERPL-SCNC: 26 MMOL/L (ref 21–32)
CREAT SERPL-MCNC: 0.74 MG/DL (ref 0.6–1.3)
GFR SERPL CREATININE-BSD FRML MDRD: 84 ML/MIN/1.73SQ M
GLUCOSE P FAST SERPL-MCNC: 97 MG/DL (ref 65–99)
HDLC SERPL-MCNC: 60 MG/DL
LDLC SERPL CALC-MCNC: 117 MG/DL (ref 0–100)
NONHDLC SERPL-MCNC: 138 MG/DL
POTASSIUM SERPL-SCNC: 4.4 MMOL/L (ref 3.5–5.3)
PROT SERPL-MCNC: 6.6 G/DL (ref 6.4–8.4)
SODIUM SERPL-SCNC: 140 MMOL/L (ref 135–147)
TRIGL SERPL-MCNC: 103 MG/DL

## 2024-09-18 PROCEDURE — 71271 CT THORAX LUNG CANCER SCR C-: CPT

## 2024-09-18 PROCEDURE — 80053 COMPREHEN METABOLIC PANEL: CPT

## 2024-09-18 PROCEDURE — 80061 LIPID PANEL: CPT

## 2024-09-18 PROCEDURE — 36415 COLL VENOUS BLD VENIPUNCTURE: CPT

## 2025-01-16 ENCOUNTER — OFFICE VISIT (OUTPATIENT)
Dept: FAMILY MEDICINE CLINIC | Facility: MEDICAL CENTER | Age: 65
End: 2025-01-16
Payer: COMMERCIAL

## 2025-01-16 VITALS
OXYGEN SATURATION: 98 % | SYSTOLIC BLOOD PRESSURE: 160 MMHG | BODY MASS INDEX: 29.09 KG/M2 | HEIGHT: 66 IN | DIASTOLIC BLOOD PRESSURE: 98 MMHG | TEMPERATURE: 97.8 F | WEIGHT: 181 LBS | HEART RATE: 86 BPM | RESPIRATION RATE: 17 BRPM

## 2025-01-16 DIAGNOSIS — F32.2 CURRENT SEVERE EPISODE OF MAJOR DEPRESSIVE DISORDER WITHOUT PSYCHOTIC FEATURES WITHOUT PRIOR EPISODE (HCC): ICD-10-CM

## 2025-01-16 DIAGNOSIS — R03.0 ELEVATED BLOOD PRESSURE READING IN OFFICE WITHOUT DIAGNOSIS OF HYPERTENSION: ICD-10-CM

## 2025-01-16 DIAGNOSIS — Z23 NEED FOR COVID-19 VACCINE: ICD-10-CM

## 2025-01-16 DIAGNOSIS — Z00.00 MEDICARE ANNUAL WELLNESS VISIT, SUBSEQUENT: Primary | ICD-10-CM

## 2025-01-16 DIAGNOSIS — Z12.31 ENCOUNTER FOR SCREENING MAMMOGRAM FOR BREAST CANCER: ICD-10-CM

## 2025-01-16 DIAGNOSIS — E78.2 MIXED HYPERLIPIDEMIA: ICD-10-CM

## 2025-01-16 DIAGNOSIS — J44.9 CHRONIC OBSTRUCTIVE PULMONARY DISEASE, UNSPECIFIED COPD TYPE (HCC): ICD-10-CM

## 2025-01-16 DIAGNOSIS — Z13.6 ENCOUNTER FOR ABDOMINAL AORTIC ANEURYSM (AAA) SCREENING: ICD-10-CM

## 2025-01-16 PROCEDURE — G0439 PPPS, SUBSEQ VISIT: HCPCS

## 2025-01-16 PROCEDURE — 99214 OFFICE O/P EST MOD 30 MIN: CPT

## 2025-01-16 PROCEDURE — 91320 SARSCV2 VAC 30MCG TRS-SUC IM: CPT

## 2025-01-16 PROCEDURE — 90480 ADMN SARSCOV2 VAC 1/ONLY CMP: CPT

## 2025-01-16 RX ORDER — ROSUVASTATIN CALCIUM 10 MG/1
10 TABLET, COATED ORAL DAILY
Qty: 90 TABLET | Refills: 1 | Status: SHIPPED | OUTPATIENT
Start: 2025-01-16

## 2025-01-16 RX ORDER — BUPROPION HYDROCHLORIDE 150 MG/1
150 TABLET ORAL DAILY
Qty: 90 TABLET | Refills: 1 | Status: SHIPPED | OUTPATIENT
Start: 2025-01-16

## 2025-01-16 NOTE — ASSESSMENT & PLAN NOTE
Stable with current management.  Continue Wellbutrin, refilled.    Orders:    buPROPion (WELLBUTRIN XL) 150 mg 24 hr tablet; Take 1 tablet (150 mg total) by mouth daily

## 2025-01-16 NOTE — ASSESSMENT & PLAN NOTE
Stable with current management.  Encouraged to follow healthy diet, limit saturated fat intake and continue rosuvastatin at current dose, refilled.  Orders:    rosuvastatin (CRESTOR) 10 MG tablet; Take 1 tablet (10 mg total) by mouth daily

## 2025-01-16 NOTE — PROGRESS NOTES
Name: Allyson Nagy      : 1960      MRN: [id number]  Encounter Provider: NABOR Nix  Encounter Date: 2025   Encounter department: [facility]    Assessment & Plan  Medicare annual wellness visit, subsequent  COVID booster updated today.  Return in 6 months for follow-up and in 1 year for AWV.  Colonoscopy up-to-date.  Mammogram due, order placed.  AAA screening ultrasound order placed.  Advised about Shingrix vaccine.       Encounter for screening mammogram for breast cancer    Orders:    Mammo screening bilateral w 3d and cad; Future    Current severe episode of major depressive disorder without 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 episode (HCC)  Stable with current management.  Continue Wellbutrin, refilled.    Orders:    buPROPion (WELLBUTRIN XL) 150 mg 24 hr tablet; Take 1 tablet (150 mg total) by mouth daily    Mixed hyperlipidemia  Stable with current management.  Encouraged to follow healthy diet, limit saturated fat intake and continue rosuvastatin at current dose, refilled.  Orders:    rosuvastatin (CRESTOR) 10 MG tablet; Take 1 tablet (10 mg total) by mouth daily    Need for COVID-19 vaccine    Orders:    COVID-19 Pfizer mRNA vaccine 12 yr and older (Comirnaty pre-filled syringe)    Chronic obstructive pulmonary disease, unspecified COPD type (HCC)  Symptoms stable, no use of inhalers.  Encouraged patient to quit smoking.       Encounter for abdominal aortic aneurysm (AAA) screening    Orders:    US abdominal aorta screening aaa; Future    Elevated blood pressure reading in office without diagnosis of hypertension  Blood pressure elevated in office today at 148/98 and 160/98 upon recheck.  No prior history of hypertension, no current medication use.  Patient with increased stress due to recent loss of a few family members, also reports having multiple cups of coffee prior to arrival.  Advised patient to monitor home blood pressure readings over the next 1 to 2  weeks, send log.  Encouraged to limit caffeine and sodium intake.          Preventive health issues were discussed with patient, and age appropriate screening tests were ordered as noted in patient's After Visit Summary. Personalized health advice and appropriate referrals for health education or preventive services given if needed, as noted in patient's After Visit Summary.    History of Present Illness     64-year-old pleasant female presents for annual Medicare wellness visit.  She is doing well overall.  She does report recently losing several family members over the past couple of months.  She seems to be doing okay, coping well with this.  She does have depression and anxiety, doing well with Wellbutrin.  She has hyperlipidemia, tolerating rosuvastatin well.  She offers no concerns or complaints at this time.       Patient Care Team:  NABOR Nix as PCP - General (Nurse Practitioner)  Amado Felix DO as PCP - PCP-[facility]  Jeanie Blanc MD    Review of Systems   Constitutional: Negative.    HENT: Negative.     Eyes: Negative.    Respiratory: Negative.     Cardiovascular: Negative.    Gastrointestinal: Negative.    Endocrine: Negative.    Genitourinary: Negative.    Musculoskeletal: Negative.    Skin: Negative.    Allergic/Immunologic: Negative.    Neurological: Negative.    Hematological: Negative.    Psychiatric/Behavioral: Negative.       Medical History Reviewed by provider this encounter:  Tobacco  Allergies  Meds  Problems  Med Hx  Surg Hx  Fam Hx       Annual Wellness Visit Questionnaire   Allyson is here for her Subsequent Wellness visit.     Health Risk Assessment:   Patient rates overall health as good. Patient feels that their physical health rating is same. Patient is satisfied with their life. Eyesight was rated as slightly worse. Hearing was rated as same. Patient feels that their emotional and mental health rating is slightly worse. Patients  states they are sometimes angry. Patient states they are often unusually tired/fatigued. Pain experienced in the last 7 days has been some. Patient states that she has experienced no weight loss or gain in last 6 months. Needs new hearing aids, glasses and dentures. She is working on all of these things.     Depression Screening:   PHQ-9 Score: 2      Fall Risk Screening:   In the past year, patient has experienced: no history of falling in past year      Urinary Incontinence Screening:   Patient has leaked urine accidently in the last six months. Uses pads incase she does not make it to the bathroom in time, not incontinent.    Home Safety:  Patient does not have trouble with stairs inside or outside of their home. Patient has working smoke alarms and has working carbon monoxide detector. Home safety hazards include: none.     Nutrition:   Current diet is No Added Salt and Regular.     Medications:   Patient is currently taking over-the-counter supplements. OTC medications include: see medication list. Patient is able to manage medications.     Activities of Daily Living (ADLs)/Instrumental Activities of Daily Living (IADLs):   Walk and transfer into and out of bed and chair?: Yes  Dress and groom yourself?: Yes    Bathe or shower yourself?: Yes    Feed yourself? Yes  Do your laundry/housekeeping?: Yes  Manage your money, pay your bills and track your expenses?: Yes  Make your own meals?: Yes    Do your own shopping?: Yes    Previous Hospitalizations:   Any hospitalizations or ED visits within the last 12 months?: No      Advance Care Planning:   Living will: Yes    Durable POA for healthcare: Yes    Advanced directive: Yes      Cognitive Screening:   Provider or family/friend/caregiver concerned regarding cognition?: No    PREVENTIVE SCREENINGS      Cardiovascular Screening:    General: Screening Not Indicated and History Lipid Disorder      Diabetes Screening:     General: Screening Current      Colorectal Cancer  "Screening:     General: Screening Current      Breast Cancer Screening:     General: Screening Current      Cervical Cancer Screening:    General: Screening Not Indicated      Abdominal Aortic Aneurysm (AAA) Screening:      Due for: Screening AAA Ultrasound      Lung Cancer Screening:     General: Screening Current      Hepatitis C Screening:    General: Screening Current    Screening, Brief Intervention, and Referral to Treatment (SBIRT)    Screening  Typical number of drinks in a day: 3  Typical number of drinks in a week: 5  Interpretation: Low risk drinking behavior.    Single Item Drug Screening:  How often have you used an illegal drug (including marijuana) or a prescription medication for non-medical reasons in the past year? never    Single Item Drug Screen Score: 0  Interpretation: Negative screen for possible drug use disorder    Other Counseling Topics:   Car/seat belt/driving safety and calcium and vitamin D intake and regular weightbearing exercise.     Social Drivers of Health     Financial Resource Strain: Low Risk  (3/24/2023)    Overall Financial Resource Strain (CARDIA)     Difficulty of Paying Living Expenses: Not very hard   Food Insecurity: No Food Insecurity (1/16/2025)    Hunger Vital Sign     Worried About Running Out of Food in the Last Year: Never true     Ran Out of Food in the Last Year: Never true   Transportation Needs: No Transportation Needs (1/16/2025)    PRAPARE - Transportation     Lack of Transportation (Medical): No     Lack of Transportation (Non-Medical): No   Housing Stability: Low Risk  (1/16/2025)    Housing Stability Vital Sign     Unable to Pay for Housing in the Last Year: No     Number of Times Moved in the Last Year: 1     Homeless in the Last Year: No   Utilities: Not At Risk (1/16/2025)    [facility] Utilities     Threatened with loss of utilities: No     No results found.    Objective   /98   Pulse 86   Temp 97.8 °F (36.6 °C) (Temporal)   Resp 17   Ht 5' 6\" (1.676 " m)   Wt 82.1 kg (181 lb)   SpO2 98%   BMI 29.21 kg/m²     Physical Exam  Vitals and nursing note reviewed.   Constitutional:       General: She is not in acute distress.     Appearance: Normal appearance. She is not ill-appearing.   HENT:      Head: Normocephalic and atraumatic.   Cardiovascular:      Rate and Rhythm: Normal rate and regular rhythm.      Pulses: Normal pulses.      Heart sounds: Normal heart sounds.   Pulmonary:      Effort: Pulmonary effort is normal.      Breath sounds: Normal breath sounds.   Musculoskeletal:      Cervical back: Normal range of motion and neck supple.   Skin:     General: Skin is warm and dry.   Neurological:      General: No focal deficit present.      Mental Status: She is alert and oriented to person, place, and time. Mental status is at baseline.   Psychiatric:         Mood and Affect: Mood normal.         Behavior: Behavior normal.         Thought Content: Thought content normal.

## 2025-01-22 ENCOUNTER — HOSPITAL ENCOUNTER (OUTPATIENT)
Dept: RADIOLOGY | Facility: MEDICAL CENTER | Age: 65
Discharge: HOME | End: 2025-01-22
Payer: COMMERCIAL

## 2025-01-22 ENCOUNTER — APPOINTMENT (OUTPATIENT)
Dept: RADIOLOGY | Facility: MEDICAL CENTER | Age: 65
End: 2025-01-22
Payer: COMMERCIAL

## 2025-01-22 DIAGNOSIS — Z13.6 ENCOUNTER FOR ABDOMINAL AORTIC ANEURYSM (AAA) SCREENING: ICD-10-CM

## 2025-01-22 PROCEDURE — 76706 US ABDL AORTA SCREEN AAA: CPT

## 2025-01-28 ENCOUNTER — RESULTS FOLLOW-UP (OUTPATIENT)
Dept: FAMILY MEDICINE CLINIC | Facility: MEDICAL CENTER | Age: 65
End: 2025-01-28

## 2025-01-29 ENCOUNTER — TELEPHONE (OUTPATIENT)
Dept: FAMILY MEDICINE CLINIC | Facility: MEDICAL CENTER | Age: 65
End: 2025-01-29

## 2025-01-29 NOTE — TELEPHONE ENCOUNTER
Pt aware, however, they are not sure that their current wrist BP monitor is accurate or not. So they are considering coming in for an NV

## 2025-01-29 NOTE — TELEPHONE ENCOUNTER
Just have patient come in with nurse for BP check and have her bring her home blood pressure cuff to compare first.

## 2025-01-29 NOTE — TELEPHONE ENCOUNTER
Blood pressure log reviewed, blood pressure readings stable <140/90. Continue to monitor BP at home periodically, call office with readings 140/90 or above.

## 2025-01-31 ENCOUNTER — CLINICAL SUPPORT (OUTPATIENT)
Dept: FAMILY MEDICINE CLINIC | Facility: MEDICAL CENTER | Age: 65
End: 2025-01-31
Payer: COMMERCIAL

## 2025-01-31 ENCOUNTER — TELEPHONE (OUTPATIENT)
Dept: FAMILY MEDICINE CLINIC | Facility: MEDICAL CENTER | Age: 65
End: 2025-01-31

## 2025-01-31 VITALS — DIASTOLIC BLOOD PRESSURE: 100 MMHG | SYSTOLIC BLOOD PRESSURE: 160 MMHG | HEART RATE: 75 BPM

## 2025-01-31 DIAGNOSIS — I10 PRIMARY HYPERTENSION: Primary | ICD-10-CM

## 2025-01-31 DIAGNOSIS — R03.0 ELEVATED BLOOD PRESSURE READING IN OFFICE WITHOUT DIAGNOSIS OF HYPERTENSION: Primary | ICD-10-CM

## 2025-01-31 PROCEDURE — 99211 OFF/OP EST MAY X REQ PHY/QHP: CPT

## 2025-01-31 RX ORDER — AMLODIPINE BESYLATE 5 MG/1
5 TABLET ORAL DAILY
Qty: 30 TABLET | Refills: 1 | Status: SHIPPED | OUTPATIENT
Start: 2025-01-31

## 2025-01-31 NOTE — TELEPHONE ENCOUNTER
Pt is in agreement with starting medication for the bp. She would like it to go to [facility] in [place]. They will call her when it is ready.

## 2025-01-31 NOTE — TELEPHONE ENCOUNTER
Readings are too high >140/90. I would advise starting medication for BP, please let me know if patient in agreement with this and I will send to pharmacy.

## 2025-01-31 NOTE — TELEPHONE ENCOUNTER
Amlodipine sent to pharmacy she should take this medication once daily and return in about 4 weeks with myself for BP follow-up/recheck. Clerical please schedule 4 week BP f/u with me.

## 2025-02-27 ENCOUNTER — OFFICE VISIT (OUTPATIENT)
Dept: FAMILY MEDICINE CLINIC | Facility: MEDICAL CENTER | Age: 65
End: 2025-02-27
Payer: COMMERCIAL

## 2025-02-27 VITALS
HEIGHT: 66 IN | RESPIRATION RATE: 16 BRPM | TEMPERATURE: 98 F | WEIGHT: 181 LBS | SYSTOLIC BLOOD PRESSURE: 132 MMHG | HEART RATE: 82 BPM | OXYGEN SATURATION: 98 % | DIASTOLIC BLOOD PRESSURE: 86 MMHG | BODY MASS INDEX: 29.09 KG/M2

## 2025-02-27 DIAGNOSIS — I10 PRIMARY HYPERTENSION: Primary | ICD-10-CM

## 2025-02-27 PROCEDURE — 99213 OFFICE O/P EST LOW 20 MIN: CPT

## 2025-02-27 PROCEDURE — G2211 COMPLEX E/M VISIT ADD ON: HCPCS

## 2025-02-27 NOTE — ASSESSMENT & PLAN NOTE
Blood pressure improved since starting amlodipine.  Continue current dose of amlodipine 5 mg daily.  Continue to monitor home blood pressures daily or at least 3 to 4 days/week, call with readings above 140/90 consistently.

## 2025-02-27 NOTE — PROGRESS NOTES
Name: Allyson Nagy      : 1960      MRN: [id number]  Encounter Provider: NABOR Nix  Encounter Date: 2025   Encounter department: [facility]  :  Assessment & Plan  Primary hypertension  Blood pressure improved since starting amlodipine.  Continue current dose of amlodipine 5 mg daily.  Continue to monitor home blood pressures daily or at least 3 to 4 days/week, call with readings above 140/90 consistently.              History of Present Illness   64-year-old female presents for 1 month follow-up hypertension.  She was recently diagnosed last month with hypertension and started on amlodipine 5 mg daily.  Today, she tells me she is feeling well, tolerating the medication well and denies adverse side effects.   Home blood pressures have been ranging from 125/75 to as high as 142/96 with blood pressures for the most part in the 130s systolic.   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 Also, she has a lot of upcoming appointments scheduled for her teeth, hearing aids, etc that will likely interfere with jury duty. Will provide note excusing her.   No concerns otherwise at this time.        Review of Systems   Constitutional: Negative.    HENT: Negative.     Eyes: Negative.    Respiratory: Negative.     Cardiovascular: Negative.    Gastrointestinal: Negative.    Endocrine: Negative.    Genitourinary: Negative.    Musculoskeletal: Negative.    Skin: Negative.    Allergic/Immunologic: Negative.    Neurological: Negative.    Hematological: Negative.    Psychiatric/Behavioral: Negative.         Objective   There were no vitals taken for this visit.     Physical Exam  Vitals and nursing note reviewed.   Constitutional:       General: She is not in acute distress.     Appearance: Normal appearance. She is not ill-appearing.   HENT:      Head: Normocephalic and  atraumatic.   Cardiovascular:      Rate and Rhythm: Normal rate and regular rhythm.      Pulses: Normal pulses.      Heart sounds: Normal heart sounds.   Pulmonary:      Effort: Pulmonary effort is normal.      Breath sounds: Normal breath sounds.   Musculoskeletal:         General: Normal range of motion.      Right lower leg: No edema.      Left lower leg: No edema.   Neurological:      General: No focal deficit present.      Mental Status: She is alert and oriented to person, place, and time. Mental status is at baseline.   Psychiatric:         Mood and Affect: Affect normal. Mood is anxious.         Behavior: Behavior normal.         Thought Content: Thought content normal.

## 2025-03-11 ENCOUNTER — HOSPITAL ENCOUNTER (OUTPATIENT)
Dept: RADIOLOGY | Facility: MEDICAL CENTER | Age: 65
Discharge: HOME | End: 2025-03-11
Payer: COMMERCIAL

## 2025-03-11 VITALS — BODY MASS INDEX: 29.09 KG/M2 | WEIGHT: 181 LBS | HEIGHT: 66 IN

## 2025-03-11 DIAGNOSIS — Z12.31 ENCOUNTER FOR SCREENING MAMMOGRAM FOR BREAST CANCER: ICD-10-CM

## 2025-03-11 PROCEDURE — 77067 SCR MAMMO BI INCL CAD: CPT

## 2025-03-11 PROCEDURE — 77063 BREAST TOMOSYNTHESIS BI: CPT

## 2025-03-27 DIAGNOSIS — I10 PRIMARY HYPERTENSION: ICD-10-CM

## 2025-03-27 RX ORDER — AMLODIPINE BESYLATE 5 MG/1
5 TABLET ORAL DAILY
Qty: 30 TABLET | Refills: 1 | Status: SHIPPED | OUTPATIENT
Start: 2025-03-27

## 2025-05-08 ENCOUNTER — OFFICE VISIT (OUTPATIENT)
Age: 65
End: 2025-05-08
Payer: MEDICARE

## 2025-05-08 DIAGNOSIS — H25.812 COMBINED FORM OF AGE-RELATED CATARACT, LEFT EYE: Primary | ICD-10-CM

## 2025-05-08 DIAGNOSIS — H25.091 INCIPIENT SENILE CATARACT, RIGHT: ICD-10-CM

## 2025-05-08 PROCEDURE — 99204 OFFICE O/P NEW MOD 45 MIN: CPT | Performed by: OPHTHALMOLOGY

## 2025-05-08 PROCEDURE — 92025 CPTRIZED CORNEAL TOPOGRAPHY: CPT | Performed by: OPHTHALMOLOGY

## 2025-05-08 PROCEDURE — 92136 OPHTHALMIC BIOMETRY: CPT | Performed by: OPHTHALMOLOGY

## 2025-05-08 RX ORDER — PREDNISOLONE ACETATE 10 MG/ML
SUSPENSION/ DROPS OPHTHALMIC
Qty: 5 ML | Refills: 6 | Status: SHIPPED | OUTPATIENT
Start: 2025-05-08

## 2025-05-08 RX ORDER — MOXIFLOXACIN 5 MG/ML
SOLUTION/ DROPS OPHTHALMIC
Qty: 3 ML | Refills: 6 | Status: SHIPPED | OUTPATIENT
Start: 2025-05-08

## 2025-05-08 NOTE — PROGRESS NOTES
Name: Allyson Nagy      : 1960      MRN: [id number]  Encounter Provider: Yehuda Amaro DO  Encounter Date: 2025   Encounter department: [facility] OPHTHALMOLOGY  :  Assessment & Plan  Combined form of age-related cataract, left eye  Patient with visually significant cataract of the left eye that is interfering with activities of daily living.  Reviewed the risks, benefits, and alternatives to cataract surgery of the left eye.  After discussing with the patient, the patient voiced a desire to proceed with cataract surgery of the left eye.  We reviewed IOL options and patient desires a monofocal IOL set for distance vision.  Patient understands the need for reading glasses following this procedure and the possible need for distance glasses following this procedure.     Orders:  •  IOL Biometry - OU - Both Eyes  •  Corneal Topography - OU - Both Eyes  •  moxifloxacin (VIGAMOX) 0.5 % ophthalmic solution; Instill 1 drop in left eye 4 times a day after your eye procedure (Patient not taking: Reported on 2025)  •  prednisoLONE acetate (PRED FORTE) 1 % ophthalmic suspension; Instill 1 drop in left eye 4 times/day following your eye procedure (shake well) (Patient not taking: Reported on 2025)    Incipient senile cataract, right  Monitor    Return for ce/iol os.                 Allyson Nagy is a 65 y.o. female who presents for cataract evaluation.    Patient was seen at [facility] and was advised she is not able to improve with glasses.    Patient states she does have blurry vision in left eye.  History obtained from: patient    Review of Systems  Medical History Reviewed by provider this encounter:  Problems       HPI     Cataract     Additional comments: Patient was seen at [facility] and was advised she is not able to improve with glasses.    Patient states she does have blurry vision in left eye.             Comments    +PPLOV OS over past yr    POH:  ref error  POSH:  neg  FOH:   neg  OC MEDS:  neg            Last edited by Yehuda Amaro DO on 5/8/2025  6:38 PM.             Past Ocular History: Cataracts OU.  Ocular Meds/Drops: None    Base Eye Exam     Visual Acuity (Snellen - Linear)       Right Left    Dist cc 20/20 20/150    Dist ph cc  NI    Correction: Glasses          Tonometry (Applanation, 3:16 PM)       Right Left    Pressure 12 10          Pupils       Pupils APD    Right PERRL None    Left PERRL None          Visual Fields       Left Right     Full Full          Extraocular Movement       Right Left     Full Full          Neuro/Psych     Oriented x3: Yes    Mood/Affect: Normal          Dilation     Both eyes: 2.5% Phenylephrine, 1% Tropicamide @ 3:16 PM            Additional Tests     Keratometry       K1 Axis K2 Axis    Right 41.50 131 42.25 041    Left 42.50 014 43.00 104            Slit Lamp and Fundus Exam     External Exam       Right Left    External Normal Normal          Slit Lamp Exam       Right Left    Lids/Lashes Normal Normal    Conjunctiva/Sclera White and quiet White and quiet    Cornea Clear Clear    Anterior Chamber Deep and quiet Deep and quiet    Iris ph dil ph dil    Lens +1nsc, tr psc +2.5nsc, +4psc    Anterior Vitreous No PVD, clear, no cell No PVD, clear, no cell    Pharm dilated ou prior to doctor exam            Fundus Exam       Right Left    Disc sharp, no pallor sharp, no pallor    Macula flat/no heme / good foveal reflex flat/no heme / good foveal reflex    Vessels normal in caliber normal in caliber    Periphery flat, no retinal tear or detachment flat, no retinal tear or detachment    Poor view in left eye            Refraction     Wearing Rx       Sphere Cylinder Axis    Right +2.50 -1.00 116    Left +2.25 -1.25 078    Age: 2y          Manifest Refraction (Auto)       Sphere Cylinder Axis    Right +2.75 -0.50 118    Left -4.50 -1.00 047                  IMAGING:  IOL Biometry - OU - Both Eyes        Right Eye  Lens style: CNA0T0. Lens power:  22.5. Target refraction: -0.25. Formula used: (BU2).     Left Eye  Lens style: CNA0T0. Lens power: 22.5. Target refraction: -0.25. Formula used: (BU2).     Notes  SEE SCANNED [facility]STAR MEASUREMENTS       Corneal Topography - OU - Both Eyes      Component Value Flag Ref Range Units Status    Astigmatism (OS) 0.6       D     Astigmatism (OD) 0.4       D              Right Eye  Progression has no prior data. Findings include normal observations. Astigmatism is 0.4 D.     Left Eye  Progression has no prior data. Findings include normal observations. Astigmatism is 0.6 D.     Notes  PCAM OU

## 2025-05-12 ENCOUNTER — PREP FOR PROCEDURE (OUTPATIENT)
Age: 65
End: 2025-05-12

## 2025-05-12 DIAGNOSIS — H25.812 COMBINED FORMS OF AGE-RELATED CATARACT OF LEFT EYE: Primary | ICD-10-CM

## 2025-05-27 ENCOUNTER — CONSULT (OUTPATIENT)
Dept: FAMILY MEDICINE CLINIC | Facility: MEDICAL CENTER | Age: 65
End: 2025-05-27
Payer: COMMERCIAL

## 2025-05-27 ENCOUNTER — ANESTHESIA (OUTPATIENT)
Dept: ANESTHESIOLOGY | Facility: HOSPITAL | Age: 65
End: 2025-05-27

## 2025-05-27 ENCOUNTER — ANESTHESIA EVENT (OUTPATIENT)
Dept: ANESTHESIOLOGY | Facility: HOSPITAL | Age: 65
End: 2025-05-27

## 2025-05-27 VITALS
SYSTOLIC BLOOD PRESSURE: 136 MMHG | WEIGHT: 180 LBS | HEART RATE: 82 BPM | HEIGHT: 66 IN | BODY MASS INDEX: 28.93 KG/M2 | DIASTOLIC BLOOD PRESSURE: 80 MMHG | RESPIRATION RATE: 20 BRPM | TEMPERATURE: 97.4 F | OXYGEN SATURATION: 98 %

## 2025-05-27 DIAGNOSIS — N39.46 MIXED STRESS AND URGE URINARY INCONTINENCE: ICD-10-CM

## 2025-05-27 DIAGNOSIS — I10 PRIMARY HYPERTENSION: ICD-10-CM

## 2025-05-27 DIAGNOSIS — E78.2 MIXED HYPERLIPIDEMIA: ICD-10-CM

## 2025-05-27 DIAGNOSIS — Z72.0 TOBACCO USE: ICD-10-CM

## 2025-05-27 DIAGNOSIS — Z01.818 PRE-OP EXAMINATION: Primary | ICD-10-CM

## 2025-05-27 DIAGNOSIS — F32.2 CURRENT SEVERE EPISODE OF MAJOR DEPRESSIVE DISORDER WITHOUT PSYCHOTIC FEATURES WITHOUT PRIOR EPISODE (HCC): ICD-10-CM

## 2025-05-27 DIAGNOSIS — F41.9 ANXIETY: ICD-10-CM

## 2025-05-27 DIAGNOSIS — H25.9 SENILE CATARACT OF LEFT EYE, UNSPECIFIED AGE-RELATED CATARACT TYPE: ICD-10-CM

## 2025-05-27 PROBLEM — K21.9 ACID REFLUX: Status: RESOLVED | Noted: 2017-05-12 | Resolved: 2025-05-27

## 2025-05-27 PROBLEM — R22.1 NECK MASS: Status: RESOLVED | Noted: 2017-05-12 | Resolved: 2025-05-27

## 2025-05-27 PROBLEM — N64.4 BREAST PAIN: Status: RESOLVED | Noted: 2018-12-28 | Resolved: 2025-05-27

## 2025-05-27 PROBLEM — E04.1 THYROID NODULE: Status: RESOLVED | Noted: 2017-06-05 | Resolved: 2025-05-27

## 2025-05-27 PROBLEM — R93.89 ABNORMAL ULTRASOUND OF NECK: Status: RESOLVED | Noted: 2017-05-25 | Resolved: 2025-05-27

## 2025-05-27 PROBLEM — J44.9 CHRONIC OBSTRUCTIVE PULMONARY DISEASE, UNSPECIFIED COPD TYPE (HCC): Status: RESOLVED | Noted: 2025-01-16 | Resolved: 2025-05-27

## 2025-05-27 PROBLEM — M47.812 CERVICAL ARTHRITIS: Status: RESOLVED | Noted: 2017-06-05 | Resolved: 2025-05-27

## 2025-05-27 PROCEDURE — 99214 OFFICE O/P EST MOD 30 MIN: CPT | Performed by: INTERNAL MEDICINE

## 2025-05-27 NOTE — ASSESSMENT & PLAN NOTE
Orders:    CBC and differential; Future    Comprehensive metabolic panel; Future    Lipid panel; Future    TSH, 3rd generation; Future  Blood pressure is stable, continue amlodipine

## 2025-05-27 NOTE — PROGRESS NOTES
Name: Allyson Nagy      : 1960      MRN: [id number]  Encounter Provider: Mercedes Haddad MD  Encounter Date: 2025   Encounter department: [facility]  :  Assessment & Plan  Pre-op examination  Patient is medically cleared for surgery       Senile cataract of left eye, unspecified age-related cataract type  Follow-up with ophthalmology       Primary hypertension    Orders:    CBC and differential; Future    Comprehensive metabolic panel; Future    Lipid panel; Future    TSH, 3rd generation; Future  Blood pressure is stable, continue amlodipine  Mixed hyperlipidemia  Continue Crestor       Current severe episode of major depressive disorder without 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 episode (HCC)    Continue Wellbutrin       Anxiety  Continue Wellbutrin       Mixed stress and urge urinary incontinence  Was advised Kegel exercises       Tobacco use  Was told to quit smoking             Falls Plan of Care: balance, strength, and gait training instructions were provided.     Urinary Incontinence Plan of Care: counseling topics discussed: practice Kegel (pelvic floor strengthening) exercises, limit alcohol, caffeine, spicy foods, and acidic foods and limiting fluid intake 3-4 hours before bed.       History of Present Illness   Pre-op Exam    Pre-operative Risk Factors:    History of cerebrovascular disease: No    History of ischemic heart disease: No  Pre-operative treatment with insulin: No  Pre-operative creatinine >2 mg/dL: No    History of congestive heart failure: No    Review of Systems   Constitutional:  Negative for chills, diaphoresis, fatigue and fever.   HENT:  Negative for congestion, ear discharge, ear pain, hearing loss, postnasal drip, rhinorrhea, sinus pressure, sinus pain, sneezing, sore throat and voice change.    Eyes:  Positive for visual disturbance. Negative for pain, discharge and redness.   Respiratory:  Negative for cough, chest tightness, shortness of breath  "and wheezing.    Cardiovascular:  Negative for chest pain, palpitations and leg swelling.   Gastrointestinal:  Negative for abdominal distention, abdominal pain, blood in stool, constipation, diarrhea, nausea and vomiting.   Endocrine: Negative for cold intolerance, heat intolerance, polydipsia, polyphagia and polyuria.   Genitourinary:  Negative for dysuria, flank pain, frequency, hematuria and urgency.   Musculoskeletal:  Negative for arthralgias, back pain, gait problem, joint swelling, myalgias, neck pain and neck stiffness.   Skin:  Negative for rash.   Neurological:  Negative for dizziness, tremors, syncope, facial asymmetry, speech difficulty, weakness, light-headedness, numbness and headaches.   Hematological:  Does not bruise/bleed easily.   Psychiatric/Behavioral:  Negative for behavioral problems, confusion and sleep disturbance. The patient is not nervous/anxious.        Objective   /86 (Patient Position: Sitting, Cuff Size: Standard)   Pulse 82   Temp (!) 97.4 °F (36.3 °C) (Temporal)   Resp 20   Ht 5' 6\" (1.676 m)   Wt 81.6 kg (180 lb)   SpO2 98%   BMI 29.05 kg/m²      Physical Exam  Constitutional:       General: She is not in acute distress.     Appearance: She is well-developed. She is not diaphoretic.   HENT:      Head: Normocephalic and atraumatic.      Right Ear: External ear normal.      Left Ear: External ear normal.      Nose: Nose normal.     Eyes:      General: No scleral icterus.        Right eye: No discharge.         Left eye: No discharge.      Conjunctiva/sclera: Conjunctivae normal.       Cardiovascular:      Rate and Rhythm: Normal rate and regular rhythm.      Heart sounds: Normal heart sounds. No murmur heard.     No friction rub. No gallop.   Pulmonary:      Effort: Pulmonary effort is normal. No respiratory distress.      Breath sounds: Normal breath sounds. No wheezing or rales.   Abdominal:      General: Bowel sounds are normal. There is no distension.      " Palpations: Abdomen is soft.      Tenderness: There is no abdominal tenderness. There is no guarding or rebound.     Musculoskeletal:         General: No tenderness.     Skin:     General: Skin is warm and dry.      Findings: No erythema or rash.     Neurological:      Mental Status: She is alert and oriented to person, place, and time.      Cranial Nerves: No cranial nerve deficit.      Sensory: No sensory deficit.      Motor: No abnormal muscle tone.     Psychiatric:         Behavior: Behavior normal.

## 2025-05-28 DIAGNOSIS — I10 PRIMARY HYPERTENSION: ICD-10-CM

## 2025-05-29 RX ORDER — AMLODIPINE BESYLATE 5 MG/1
5 TABLET ORAL DAILY
Qty: 30 TABLET | Refills: 5 | Status: SHIPPED | OUTPATIENT
Start: 2025-05-29

## 2025-06-05 NOTE — PRE-PROCEDURE INSTRUCTIONS
Pre-Surgery Instructions:   Medication Instructions    amLODIPine (NORVASC) 5 mg tablet Take day of surgery.    aspirin 81 mg chewable tablet Take day of surgery.per protocol    buPROPion (WELLBUTRIN XL) 150 mg 24 hr tablet Take day of surgery.    Multiple Vitamins-Minerals (MULTIVITAMIN WITH MINERALS) tablet Last dose 6-5-25    rosuvastatin (CRESTOR) 10 MG tablet Take day of surgery.   Medication instructions for day of surgery reviewed. Please take all instructed medications with only a sip of water. Please do not take any over the counter (non-prescribed) vitamins or supplements for one week prior to date of surgery.      You will receive a call one business day prior to surgery with an arrival time and hospital directions. If your surgery is scheduled on a Monday, the hospital will be calling you on the Friday prior to your surgery. If you have not heard from anyone by 8pm, please call the hospital supervisor through the hospital  at [phone].or [place] [phone]).    Do not eat or drink anything after midnight the night before your surgery, including candy, mints, lifesavers, or chewing gum. Do not drink alcohol 24hrs before your surgery. Try not to smoke at least 24hrs before your surgery.       Follow the pre surgery showering instructions as listed in the “My Surgical Experience Booklet” or otherwise provided by your surgeon's office. Do not use a blade to shave the surgical area 1 week before surgery. It is okay to use a clean electric clippers up to 24 hours before surgery. Do not apply any lotions, creams, including makeup, cologne, deodorant, or perfumes after showering on the day of your surgery. Do not use dry shampoo, hair spray, hair gel, or any type of hair products.     No contact lenses, eye make-up, or artificial eyelashes. Remove nail polish, including gel polish, and any artificial, gel, or acrylic nails if possible. Remove all jewelry including rings and body piercing jewelry.      Wear causal clothing that is easy to take on and off. Consider your type of surgery.    Keep any valuables, jewelry, piercings at home. Please bring any specially ordered equipment (sling, braces) if indicated.    Arrange for a responsible person to drive you to and from the hospital on the day of your surgery. Please confirm the visitor policy for the day of your procedure when you receive your phone call with an arrival time.     Call the surgeon's office with any new illnesses, exposures, or additional questions prior to surgery.    Please reference your “My Surgical Experience Booklet” for additional information to prepare for your upcoming surgery.

## 2025-06-08 ENCOUNTER — PREP FOR PROCEDURE (OUTPATIENT)
Age: 65
End: 2025-06-08

## 2025-06-08 LAB
Lab: 0.4 D
Lab: 0.6 D

## 2025-06-08 RX ORDER — PROPARACAINE HYDROCHLORIDE 5 MG/ML
1 SOLUTION/ DROPS OPHTHALMIC ONCE
Status: CANCELLED | OUTPATIENT
Start: 2025-06-08 | End: 2025-06-08

## 2025-06-08 RX ORDER — TROPICAMIDE 10 MG/ML
1 SOLUTION/ DROPS OPHTHALMIC
Status: CANCELLED | OUTPATIENT
Start: 2025-06-08 | End: 2025-06-08

## 2025-06-08 RX ORDER — SODIUM CHLORIDE, SODIUM LACTATE, POTASSIUM CHLORIDE, CALCIUM CHLORIDE 600; 310; 30; 20 MG/100ML; MG/100ML; MG/100ML; MG/100ML
20 INJECTION, SOLUTION INTRAVENOUS CONTINUOUS
Status: CANCELLED | OUTPATIENT
Start: 2025-06-08

## 2025-06-08 RX ORDER — CYCLOPENTOLATE HYDROCHLORIDE 10 MG/ML
1 SOLUTION/ DROPS OPHTHALMIC
Status: CANCELLED | OUTPATIENT
Start: 2025-06-08 | End: 2025-06-08

## 2025-06-08 RX ORDER — PHENYLEPHRINE HYDROCHLORIDE 25 MG/ML
1 SOLUTION/ DROPS OPHTHALMIC
Status: CANCELLED | OUTPATIENT
Start: 2025-06-08 | End: 2025-06-08

## 2025-06-08 NOTE — H&P
S:    -66yo CF with progressive decline in quality of vision OS     PMH:  HTN  HLD      Meds:  Norvasc  Aspirin  Wellbutrin xl  MVI  Crestor    O: See Ophth encounter with me dated 5-8-25 for full ophth exam        PHYSICAL EXAMINATION:      Head:  normocephalic / atraumatic ; cataract OS     Ears:  hearing intact to voice      Nose:  nares clear      Throat:  throat normal      Respiratory:  CTA      Neck:  clear      Cardiovascular:  NSR      Abdomen:  soft      Extremities:  negative edema      Neurologic:  grossly intact      Mental Status/Communication:  within range of normal variation      Physical Disabilities: none            IMPRESSION:      NS Cataract OS           PLAN:        Ce/iol OS - 6-11-25

## 2025-06-10 ENCOUNTER — ANESTHESIA EVENT (OUTPATIENT)
Dept: PERIOP | Facility: AMBULARY SURGERY CENTER | Age: 65
End: 2025-06-10
Payer: COMMERCIAL

## 2025-06-10 NOTE — ANESTHESIA PREPROCEDURE EVALUATION
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)      Behavioral Health   (+) Tobacco use      Blood   (+) Polycythemia      Other   (+) Snoring      EKG 9/2021:  Normal sinus rhythm  Normal ECG  When compared with ECG of 10-SEP-2021 10:47, (unconfirmed)  No significant change was found    Lab Results   Component Value Date    WBC 7.71 08/28/2021    HGB 15.7 (H) 08/28/2021    HCT 49.5 (H) 08/28/2021    MCV 97 08/28/2021     08/28/2021     Lab Results   Component Value Date    SODIUM 140 09/18/2024    K 4.4 09/18/2024     09/18/2024    CO2 26 09/18/2024    BUN 12 09/18/2024    CREATININE 0.74 09/18/2024    GLUC 109 (H) 12/21/2019    CALCIUM 9.4 09/18/2024     Lab Results   Component Value Date    INR 1.0 12/21/2019    INR 0.99 05/12/2015    PROTIME 12.5 05/12/2015     Lab Results   Component Value Date    HGBA1C 5.5 08/28/2021          Physical Exam    Airway     Mallampati score: II    Neck ROM: full      Cardiovascular  Cardiovascular exam normal    Dental        Pulmonary  Pulmonary exam normal     Neurological  - normal exam    Other Findings  post-pubertal.      Anesthesia Plan  ASA Score- 2     Anesthesia Type- IV sedation with anesthesia with ASA Monitors.         Additional Monitors:     Airway Plan: natural airway.           Plan Factors-Exercise tolerance (METS): >4 METS.    Chart reviewed.   Existing labs reviewed. Patient summary reviewed.                  Induction- intravenous.    Postoperative Plan- .   Monitoring Plan - Monitoring plan - standard ASA  monitoring          Informed Consent- Anesthetic plan and risks discussed with patient.  I personally reviewed this patient with the CRNA. Discussed and agreed on the Anesthesia Plan with the CRNA..      NPO Status:  No vitals data found for the desired time range.

## 2025-06-11 ENCOUNTER — ANESTHESIA (OUTPATIENT)
Dept: PERIOP | Facility: AMBULARY SURGERY CENTER | Age: 65
End: 2025-06-11
Payer: COMMERCIAL

## 2025-06-11 ENCOUNTER — HOSPITAL ENCOUNTER (OUTPATIENT)
Facility: AMBULARY SURGERY CENTER | Age: 65
Setting detail: OUTPATIENT SURGERY
Discharge: HOME | End: 2025-06-11
Attending: OPHTHALMOLOGY | Admitting: OPHTHALMOLOGY
Payer: COMMERCIAL

## 2025-06-11 VITALS
TEMPERATURE: 97.4 F | DIASTOLIC BLOOD PRESSURE: 77 MMHG | BODY MASS INDEX: 28.12 KG/M2 | RESPIRATION RATE: 18 BRPM | OXYGEN SATURATION: 96 % | HEIGHT: 66 IN | WEIGHT: 175 LBS | HEART RATE: 87 BPM | SYSTOLIC BLOOD PRESSURE: 127 MMHG

## 2025-06-11 PROBLEM — F17.210 CIGARETTE SMOKER: Status: ACTIVE | Noted: 2025-06-11

## 2025-06-11 PROCEDURE — 66984 XCAPSL CTRC RMVL W/O ECP: CPT | Performed by: OPHTHALMOLOGY

## 2025-06-11 PROCEDURE — NC001 PR NO CHARGE: Performed by: OPHTHALMOLOGY

## 2025-06-11 PROCEDURE — V2632 POST CHMBR INTRAOCULAR LENS: HCPCS | Performed by: OPHTHALMOLOGY

## 2025-06-11 DEVICE — CLAREON ASPHERIC HYDROPHOBIC ACRYLIC IOL WITH THE AUTONOMEAUTOMATED PRE-LOADED DELIVERY SYSTEM
Type: IMPLANTABLE DEVICE | Site: EYE | Status: FUNCTIONAL
Brand: CLAREON™

## 2025-06-11 RX ORDER — PHENYLEPHRINE HYDROCHLORIDE 25 MG/ML
1 SOLUTION/ DROPS OPHTHALMIC
Status: COMPLETED | OUTPATIENT
Start: 2025-06-11 | End: 2025-06-11

## 2025-06-11 RX ORDER — TROPICAMIDE 10 MG/ML
1 SOLUTION/ DROPS OPHTHALMIC
Status: COMPLETED | OUTPATIENT
Start: 2025-06-11 | End: 2025-06-11

## 2025-06-11 RX ORDER — GLYCOPYRROLATE 0.2 MG/ML
INJECTION INTRAMUSCULAR; INTRAVENOUS AS NEEDED
Status: DISCONTINUED | OUTPATIENT
Start: 2025-06-11 | End: 2025-06-11

## 2025-06-11 RX ORDER — MOXIFLOXACIN 5 MG/ML
SOLUTION/ DROPS OPHTHALMIC AS NEEDED
Status: DISCONTINUED | OUTPATIENT
Start: 2025-06-11 | End: 2025-06-11 | Stop reason: HOSPADM

## 2025-06-11 RX ORDER — PROPARACAINE HYDROCHLORIDE 5 MG/ML
SOLUTION/ DROPS OPHTHALMIC AS NEEDED
Status: DISCONTINUED | OUTPATIENT
Start: 2025-06-11 | End: 2025-06-11 | Stop reason: HOSPADM

## 2025-06-11 RX ORDER — ONDANSETRON 2 MG/ML
4 INJECTION INTRAMUSCULAR; INTRAVENOUS ONCE AS NEEDED
Status: DISCONTINUED | OUTPATIENT
Start: 2025-06-11 | End: 2025-06-11 | Stop reason: HOSPADM

## 2025-06-11 RX ORDER — TETRACAINE HYDROCHLORIDE 5 MG/ML
SOLUTION OPHTHALMIC AS NEEDED
Status: DISCONTINUED | OUTPATIENT
Start: 2025-06-11 | End: 2025-06-11 | Stop reason: HOSPADM

## 2025-06-11 RX ORDER — POVIDONE-IODINE 100 MG/G
OINTMENT TOPICAL AS NEEDED
Status: DISCONTINUED | OUTPATIENT
Start: 2025-06-11 | End: 2025-06-11 | Stop reason: HOSPADM

## 2025-06-11 RX ORDER — SODIUM CHLORIDE, SODIUM LACTATE, POTASSIUM CHLORIDE, CALCIUM CHLORIDE 600; 310; 30; 20 MG/100ML; MG/100ML; MG/100ML; MG/100ML
20 INJECTION, SOLUTION INTRAVENOUS CONTINUOUS
Status: DISCONTINUED | OUTPATIENT
Start: 2025-06-11 | End: 2025-06-11 | Stop reason: HOSPADM

## 2025-06-11 RX ORDER — PROPARACAINE HYDROCHLORIDE 5 MG/ML
1 SOLUTION/ DROPS OPHTHALMIC ONCE
Status: COMPLETED | OUTPATIENT
Start: 2025-06-11 | End: 2025-06-11

## 2025-06-11 RX ORDER — CYCLOPENTOLATE HYDROCHLORIDE 10 MG/ML
1 SOLUTION/ DROPS OPHTHALMIC
Status: COMPLETED | OUTPATIENT
Start: 2025-06-11 | End: 2025-06-11

## 2025-06-11 RX ORDER — SODIUM CHLORIDE, SODIUM LACTATE, POTASSIUM CHLORIDE, CALCIUM CHLORIDE 600; 310; 30; 20 MG/100ML; MG/100ML; MG/100ML; MG/100ML
INJECTION, SOLUTION INTRAVENOUS CONTINUOUS PRN
Status: DISCONTINUED | OUTPATIENT
Start: 2025-06-11 | End: 2025-06-11

## 2025-06-11 RX ORDER — LIDOCAINE HYDROCHLORIDE 10 MG/ML
INJECTION, SOLUTION EPIDURAL; INFILTRATION; INTRACAUDAL; PERINEURAL AS NEEDED
Status: DISCONTINUED | OUTPATIENT
Start: 2025-06-11 | End: 2025-06-11

## 2025-06-11 RX ORDER — DEXAMETHASONE SODIUM PHOSPHATE 10 MG/ML
INJECTION, SOLUTION INTRAMUSCULAR; INTRAVENOUS AS NEEDED
Status: DISCONTINUED | OUTPATIENT
Start: 2025-06-11 | End: 2025-06-11 | Stop reason: HOSPADM

## 2025-06-11 RX ORDER — ACETAMINOPHEN 325 MG/1
650 TABLET ORAL EVERY 4 HOURS PRN
Status: CANCELLED | OUTPATIENT
Start: 2025-06-11

## 2025-06-11 RX ORDER — LIDOCAINE HYDROCHLORIDE 10 MG/ML
INJECTION, SOLUTION EPIDURAL; INFILTRATION; INTRACAUDAL; PERINEURAL AS NEEDED
Status: DISCONTINUED | OUTPATIENT
Start: 2025-06-11 | End: 2025-06-11 | Stop reason: HOSPADM

## 2025-06-11 RX ORDER — POVIDONE-IODINE 5 %
SOLUTION, NON-ORAL OPHTHALMIC (EYE) AS NEEDED
Status: DISCONTINUED | OUTPATIENT
Start: 2025-06-11 | End: 2025-06-11 | Stop reason: HOSPADM

## 2025-06-11 RX ORDER — BALANCED SALT SOLUTION 6.4; .75; .48; .3; 3.9; 1.7 MG/ML; MG/ML; MG/ML; MG/ML; MG/ML; MG/ML
SOLUTION OPHTHALMIC AS NEEDED
Status: DISCONTINUED | OUTPATIENT
Start: 2025-06-11 | End: 2025-06-11 | Stop reason: HOSPADM

## 2025-06-11 RX ORDER — MIDAZOLAM HYDROCHLORIDE 2 MG/2ML
INJECTION, SOLUTION INTRAMUSCULAR; INTRAVENOUS AS NEEDED
Status: DISCONTINUED | OUTPATIENT
Start: 2025-06-11 | End: 2025-06-11

## 2025-06-11 RX ORDER — FENTANYL CITRATE 50 UG/ML
INJECTION, SOLUTION INTRAMUSCULAR; INTRAVENOUS AS NEEDED
Status: DISCONTINUED | OUTPATIENT
Start: 2025-06-11 | End: 2025-06-11

## 2025-06-11 RX ADMIN — MIDAZOLAM 0.5 MG: 1 INJECTION INTRAMUSCULAR; INTRAVENOUS at 09:46

## 2025-06-11 RX ADMIN — MIDAZOLAM 0.5 MG: 1 INJECTION INTRAMUSCULAR; INTRAVENOUS at 09:39

## 2025-06-11 RX ADMIN — PHENYLEPHRINE HYDROCHLORIDE 1 DROP: 25 SOLUTION/ DROPS OPHTHALMIC at 08:52

## 2025-06-11 RX ADMIN — CYCLOPENTOLATE HYDROCHLORIDE 1 DROP: 10 SOLUTION/ DROPS OPHTHALMIC at 09:01

## 2025-06-11 RX ADMIN — GLYCOPYRROLATE 0.2 MG: 0.2 INJECTION INTRAMUSCULAR; INTRAVENOUS at 09:28

## 2025-06-11 RX ADMIN — FENTANYL CITRATE 25 MCG: 50 INJECTION INTRAMUSCULAR; INTRAVENOUS at 09:52

## 2025-06-11 RX ADMIN — TROPICAMIDE 1 DROP: 10 SOLUTION/ DROPS OPHTHALMIC at 08:52

## 2025-06-11 RX ADMIN — MIDAZOLAM 0.5 MG: 1 INJECTION INTRAMUSCULAR; INTRAVENOUS at 09:28

## 2025-06-11 RX ADMIN — LIDOCAINE HYDROCHLORIDE 50 MG: 10 INJECTION, SOLUTION EPIDURAL; INFILTRATION; INTRACAUDAL at 09:28

## 2025-06-11 RX ADMIN — PHENYLEPHRINE HYDROCHLORIDE 1 DROP: 25 SOLUTION/ DROPS OPHTHALMIC at 09:02

## 2025-06-11 RX ADMIN — MIDAZOLAM 0.5 MG: 1 INJECTION INTRAMUSCULAR; INTRAVENOUS at 09:40

## 2025-06-11 RX ADMIN — PROPARACAINE HYDROCHLORIDE 1 DROP: 5 SOLUTION/ DROPS OPHTHALMIC at 08:49

## 2025-06-11 RX ADMIN — FENTANYL CITRATE 25 MCG: 50 INJECTION INTRAMUSCULAR; INTRAVENOUS at 10:15

## 2025-06-11 RX ADMIN — CYCLOPENTOLATE HYDROCHLORIDE 1 DROP: 10 SOLUTION/ DROPS OPHTHALMIC at 08:51

## 2025-06-11 RX ADMIN — PHENYLEPHRINE HYDROCHLORIDE 1 DROP: 25 SOLUTION/ DROPS OPHTHALMIC at 08:57

## 2025-06-11 RX ADMIN — TROPICAMIDE 1 DROP: 10 SOLUTION/ DROPS OPHTHALMIC at 08:57

## 2025-06-11 RX ADMIN — TROPICAMIDE 1 DROP: 10 SOLUTION/ DROPS OPHTHALMIC at 09:03

## 2025-06-11 RX ADMIN — FENTANYL CITRATE 25 MCG: 50 INJECTION INTRAMUSCULAR; INTRAVENOUS at 09:44

## 2025-06-11 RX ADMIN — FENTANYL CITRATE 25 MCG: 50 INJECTION INTRAMUSCULAR; INTRAVENOUS at 09:29

## 2025-06-11 RX ADMIN — SODIUM CHLORIDE, SODIUM LACTATE, POTASSIUM CHLORIDE, AND CALCIUM CHLORIDE: .6; .31; .03; .02 INJECTION, SOLUTION INTRAVENOUS at 09:24

## 2025-06-11 RX ADMIN — CYCLOPENTOLATE HYDROCHLORIDE 1 DROP: 10 SOLUTION/ DROPS OPHTHALMIC at 08:56

## 2025-06-11 NOTE — ANESTHESIA POSTPROCEDURE EVALUATION
Post-Op Assessment Note    CV Status:  Stable    Pain management: adequate       Mental Status:  Alert and awake   Hydration Status:  Euvolemic   PONV Controlled:  Controlled   Airway Patency:  Patent     Post Op Vitals Reviewed: Yes    No anethesia notable event occurred.    Staff: Anesthesiologist         Last Filed PACU Vitals:  Vitals Value Taken Time   Temp 97.4 °F (36.3 °C) 06/11/25 11:31   Pulse 87 06/11/25 11:31   /77 06/11/25 11:31   Resp 18 06/11/25 11:31   SpO2 96 % 06/11/25 11:31       Modified Radha:     Vitals Value Taken Time   Activity 2 06/11/25 11:31   Respiration 2 06/11/25 11:31   Circulation 2 06/11/25 11:31   Consciousness 2 06/11/25 11:31   Oxygen Saturation 2 06/11/25 11:31     Modified Radha Score: 10

## 2025-06-11 NOTE — OP NOTE
OPERATIVE REPORT  PATIENT NAME: Allyson Nagy    :  1960  MRN: [id number]  Pt Location: [facility] OR ROOM 01    SURGERY DATE: 2025    Surgeons and Role:     * DO Aniket Marcial Primary    Preop Diagnosis:  Combined forms of age-related cataract of left eye [H25.812]    Post-Op Diagnosis Codes:     * Combined forms of age-related cataract of left eye [H25.812]    Procedure(s):  Left - EXTRACTION EXTRACAPSULAR CATARACT PHACO INTRAOCULAR LENS (IOL)    Specimen(s):  * No specimens in log *    Estimated Blood Loss:   none    Drains:  * No LDAs found *    Anesthesia Type:   IV Sedation with Anesthesia    Operative Indications:  Combined forms of age-related cataract of left eye [H25.812]    Operative Findings:  Combined forms of age-related cataract of left eye [H25.812]       Complications:   None    Procedure and Technique:  Phaco-pciol os   I was present for the entire procedure.    Patient Disposition:  PACU     OPERATIVE REPORT         Name: Allyson Nagy     MRN: [id number]  Date: 2025 Time: 10:30 AM   _______________________________________________________________________      Location:  [facility]  Service:    Ophthalmology      Date of Operation: 2025         Surgeon: Yehuda Amaro        Anesthesia: Local anesthesia with IV sedation and monitored anesthesia care     Operation: Phacoemulsification cataract extraction with PCIOL implantation, OS         Description of Operation:      The patient was brought to the preoperative holding area where the operative eye was confirmed both verbally and on the consent form. A aamir was placed on the skin above the left eye to designate that eye as the correct surgical site. An IV line was placed by the anesthesia staff. Pharmacologic dilating drops were applied to the eye.  The patient was brought to the operating room where the anesthesia team established cardiac monitoring and light IV sedation was administered.  The patient was positioned  comfortably supine on an eye bed.  A surgical time-out was performed.   Two drops of Proparacaine were then applied and the eye was then prepped and draped in the usual sterile fashion for intraocular surgery.  A second time-out was performed, confirming the patient’s identity by name, and date of birth and confirming the correct eye and procedure to be performed. We confirmed that the appropriate IOL implants were present in the room.   An eye lid speculum was then applied for exposure of the left globe.       Sterile preservative-free tetracaine was applied to the globe. A paracentesis port was created with a sideport blade at the 1730 limbus.   A small amount of  preservative-free 1% lidocaine  was injected into the anterior chamber.  The anterior chamber was then gently filled with Viscoat. A temporal clear cornea incision was then created with a 2.4 mm keratome in a tunneled fashion.  A continuous curvilinear capsulorrhexis (CCC) was initiated with a cystitome and completed with Utrata forceps.  The CCC was nicely centered on the anterior capsule and measured approximately 5.0mm.  Copious but gentle hydrodissection was performed, allowing the nucleus to rotate freely within the capsular bag.   Additional Viscoat was placed in the anterior chamber.  The nucleus was disassembled using a stop and chop technique and the nuclear quadrants  were emulsifed without incident.  The anterior chamber was kept formed during instrument exchange by injecting Provisc into the anterior chamber.  The residual cortical material was then removed with the irrigation and aspiration handpiece.   Provisc was injected into the capsular bag and anterior chamber to add adequate volume to each.   The undersurface of the anterior capsule was polished using a reynolds sweep for 360 degrees.  An intraocular lens from [employer], model CNA0T0, 22.5 diopters in power, selected after review and interpretation of IOL measurements, was then injected  into the capsular bag through the clear corneal incision using the Autonome injector.  The IOL centered nicely within the capsular bag. The residual viscoelastic material was evacuated from the capsular bag and anterior chamber via the irrigation and aspiration handpiece.    Gentle stromal hydration was then performed at each wound site.          The anterior chamber was well-formed and maintained and the IOP was palpated and found to be at approximately 20 mmHg.         All incisions were dried and kartik-tested negative.   The anterior chamber remained well-formed and well-maintained with the IOL very nicely centered within the capsular bag, with approximately 1.0 mm of overlap of the anterior capsular leaflet to the peripheral aspect of the IOL optic for 360 degrees.         An inferonasal subconjunctival injection of Dexamethasone 2mg was then delivered. Two drops of moxifloxacin were applied to the globe.      The eyelid speculum and drapes were carefully removed and the periorbital area was wiped with a sterile, moist sponge followed by a sterile, dry sponge.         The eye was then dressed with  two drops of Moxifloxacin and a cutler shield was taped in place over the globe.   The patient tolerated the procedure well and was taken to the recovery room in excellent condition.  I discussed proper care of the eye following surgery.  Typed instructions were provided as well. The patient was provided my cell phone number to contact me should any questions or problems arise and a follow-up appointment was set for the following morning.                    SIGNATURE: Yehuda Amaro,   DATE: June 11, 2025  TIME: 10:29 AM

## 2025-06-12 ENCOUNTER — OFFICE VISIT (OUTPATIENT)
Age: 65
End: 2025-06-12

## 2025-06-12 DIAGNOSIS — Z09 POSTOP CHECK: Primary | ICD-10-CM

## 2025-06-12 PROCEDURE — 99024 POSTOP FOLLOW-UP VISIT: CPT | Performed by: OPHTHALMOLOGY

## 2025-06-12 NOTE — PROGRESS NOTES
Name: Allyson Nagy      : 1960      MRN: [id number]  Encounter Provider: Yehuda Amaro DO  Encounter Date: 2025   Encounter department: [facility] OPHTHALMOLOGY  :  Assessment & Plan  Postop check  Pod1 s/p [id number]  All looks good today    Plan:  Moxi os q4h wa  Pred os q4h wa  Reis shield os qhs  F/u 1 wk or sooner prn - check autorx/k  Return in about 1 week (around 2025) for postop.           Allyson Nagy is a 65 y.o. female who presents for 1 day POV s/p phaco/PCIOL OS.    Vision a little hazy.  Denies pain.  Slept well.    History obtained from: patient    Review of Systems  Medical History Reviewed by provider this encounter:       HPI     Post-op     Additional comments: 1 day POV s/p phaco/PCIOL OS.           Comments    Ocular Meds/Drops:   Prednisolone q4hwa OS - ld 6:30am  Moxifloxacin q4hwa OS - ld 6:35am    Pt presents for Pod1 s/p [id number]  All seems ok    Meds:  Moxi os q4h wa  Pred os q4h wa  Reis shield os qhs              Last edited by Yehuda Amaro DO on 2025  8:34 AM.             Past Ocular History: pseudo OS, cataract OD    Ocular Meds/Drops:   Prednisolone q4hwa OS - ld 6:30am   Moxifloxacin q4hwa OS - ld 6:35am       Base Eye Exam     Visual Acuity (Snellen - Linear)       Right Left    Dist sc  20/20-          Tonometry (gat, 8:44 AM)       Right Left    Pressure  15          Neuro/Psych     Oriented x3: Yes    Mood/Affect: Normal            Slit Lamp and Fundus Exam     External Exam       Right Left    External Normal Normal          Slit Lamp Exam       Right Left    Lids/Lashes Normal Normal    Conjunctiva/Sclera White and quiet White and quiet    Cornea Clear Clear, kimi neg    Anterior Chamber Deep and quiet +2 cell    Iris Round and reactive r/r    Lens +1nsc, tr psc +1 resid psc    Anterior Vitreous No PVD, clear, no cell No PVD, clear, no cell          Fundus Exam       Right Left    Disc  sharp, no pallor    C/D Ratio  ~0.3                   IMAGING:

## 2025-06-20 ENCOUNTER — OFFICE VISIT (OUTPATIENT)
Age: 65
End: 2025-06-20

## 2025-06-20 DIAGNOSIS — Z09 POSTOP CHECK: Primary | ICD-10-CM

## 2025-06-20 PROCEDURE — 99024 POSTOP FOLLOW-UP VISIT: CPT | Performed by: OPHTHALMOLOGY

## 2025-06-20 NOTE — PROGRESS NOTES
Name: Allyson Nagy      : 1960      MRN: [id number]  Encounter Provider: Yehuda Amaro DO  Encounter Date: 2025   Encounter department: [facility] OPHTHALMOLOGY  :  Assessment & Plan  Postop check  POD 9 - s/p phaco/pciol os  Healing well; has mild ac rxn (she has been underusing pred)  Moxi - stop  Pred - use tid for 1 wk then bid for 1 wk then daily for 1 wk then stop (shake well)  OK to stop the reis shield  Medication instruction sheet reviewed with and provided to pt     Return in about 4 weeks (around 2025) for postop, Refraction.                   Allyson Nagy is a 65 y.o. female who presents for postop.  s/p [id number]     C/o she had an episode of dull ache yesterday. Thinks is because the AC was on and blowing near her. Lasted for about 3-4 hrs then it went away.    Reports vision has improved.     History obtained from: patient    Review of Systems  Medical History Reviewed by provider this encounter:       HPI     Post-op     Additional comments: s/p [id number]     OC meds  Moxi os TID - ld 7pm  Pred os TID - ld 7pm            Comments    Postop check  Pod9 s/p [id number]  Doing well per pt     Ocmed:  Moxi os tid  Pred os tid  Reis shield os qhs            Last edited by Yehuda Amaro DO on 2025  8:50 AM.             Past Ocular History:  pseudo OS, cataract OD     Ocular Meds/Drops:   Moxi os TID - ld 7pm  Pred os TID - ld 7pm     Base Eye Exam     Visual Acuity (Snellen - Linear)       Right Left    Dist sc  20/25 -2    Dist cc 20/20     Correction: Glasses          Tonometry (Applanation, 8:52 AM)       Right Left    Pressure  10          Pupils       Dark    Right 2    Left 1          Visual Fields       Left Right     Full Full          Extraocular Movement       Right Left     Full Full          Neuro/Psych     Oriented x3: Yes    Mood/Affect: Normal            Additional Tests     Keratometry       K1 Axis K2 Axis    Right 42.00 159 42.50 069     Left 41.75 025 42.25 115            Slit Lamp and Fundus Exam     External Exam       Right Left    External Normal Normal          Slit Lamp Exam       Right Left    Lids/Lashes Normal Normal    Conjunctiva/Sclera White and quiet White and quiet    Cornea Clear Clear, kimi neg    Anterior Chamber Deep and quiet +0.5cell    Iris Round and reactive r/r    Lens +1nsc, tr psc +1pco    Anterior Vitreous No PVD, clear, no cell No PVD, clear, no cell          Fundus Exam       Right Left    Disc  sharp, no pallor    C/D Ratio  ~0.3            Refraction     Manifest Refraction (Auto)       Sphere Cylinder Axis    Right +2.75 -0.75 105    Left +0.25 -1.00 059                  IMAGING:

## 2025-07-02 ENCOUNTER — APPOINTMENT (EMERGENCY)
Dept: RADIOLOGY | Facility: HOSPITAL | Age: 65
End: 2025-07-02
Payer: COMMERCIAL

## 2025-07-02 ENCOUNTER — APPOINTMENT (EMERGENCY)
Dept: CT IMAGING | Facility: HOSPITAL | Age: 65
End: 2025-07-02
Payer: COMMERCIAL

## 2025-07-02 ENCOUNTER — HOSPITAL ENCOUNTER (EMERGENCY)
Facility: HOSPITAL | Age: 65
Discharge: HOME | End: 2025-07-02
Attending: EMERGENCY MEDICINE
Payer: COMMERCIAL

## 2025-07-02 VITALS
RESPIRATION RATE: 18 BRPM | DIASTOLIC BLOOD PRESSURE: 85 MMHG | TEMPERATURE: 98.3 F | HEART RATE: 98 BPM | SYSTOLIC BLOOD PRESSURE: 175 MMHG | OXYGEN SATURATION: 98 %

## 2025-07-02 DIAGNOSIS — M54.50 LOWER BACK PAIN: ICD-10-CM
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
## 2025-07-02 DIAGNOSIS — M79.601 RIGHT ARM PAIN: ICD-10-CM

## 2025-07-02 PROCEDURE — 73060 X-RAY EXAM OF HUMERUS: CPT

## 2025-07-02 PROCEDURE — 70450 CT HEAD/BRAIN W/O DYE: CPT

## 2025-07-02 PROCEDURE — 73090 X-RAY EXAM OF FOREARM: CPT

## 2025-07-02 PROCEDURE — 99284 EMERGENCY DEPT VISIT MOD MDM: CPT

## 2025-07-02 PROCEDURE — 72131 CT LUMBAR SPINE W/O DYE: CPT

## 2025-07-02 PROCEDURE — 99285 EMERGENCY DEPT VISIT HI MDM: CPT | Performed by: EMERGENCY MEDICINE

## 2025-07-02 RX ORDER — ACETAMINOPHEN 325 MG/1
650 TABLET ORAL ONCE
Status: COMPLETED | OUTPATIENT
Start: 2025-07-02 | End: 2025-07-02

## 2025-07-02 RX ADMIN — ACETAMINOPHEN 650 MG: 325 TABLET ORAL at 18:09
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
## 2025-07-03 NOTE — ED CARE HANDOFF
"Emergency Department Sign Out Note        Sign out and transfer of care from Dr Green. See Separate Emergency Department note.     The patient, Allyson Nagy, was evaluated by the previous provider for Fall.    Workup Completed:  XR forearm and humerus    ED Course / Workup Pending (followup):  Patient signed out to me.  CT head and CT lumbar spine are pending at time of signout.    While awaiting results of imaging, patient left without notifying nursing staff or myself.  Imaging has not been read by radiology.    I called reading room and they state that imaging should have been sent to [facility] however I do not see these imaging studies in [facility]'s list to be read.  I spoke with our radiology tech who states that it was never sent and she will send it to be read now and have it read stat.    I reviewed results of imaging and CT head reveals no acute intracranial hemorrhage, mass effect or midline shift.  There are chronic changes.  Similarly, there is no evidence of acute pathology on CT lumbar spine but are findings consistent with chronic changes.    Portions of the above record have been created with voice recognition software.  Occasional wrong word or \"sound alike\" substitutions may have occurred due to the inherent limitations of voice recognition software.  Read the chart carefully and recognize, using context, where substitutions may have occurred.        No data recorded                          ED Course as of 07/03/25 0007 Wed Jul 02, 2025 2048 Patient left prior to imaging results.  Will follow-up on imaging and call with results this evening.  Patient did not have an eye IV in place when they left prior to our evaluation and imaging results.     Procedures  Medical Decision Making  Amount and/or Complexity of Data Reviewed  Radiology: ordered and independent interpretation performed.    Risk  OTC drugs.            Disposition  Final diagnoses:   None     ED Disposition       ED Disposition   Left " from Room after Provider Exam    Condition   --    Date/Time   Wed Jul 2, 2025  8:58 PM    Comment   --             Follow-up Information    None       Discharge Medication List as of 7/2/2025  9:12 PM        CONTINUE these medications which have NOT CHANGED    Details   amLODIPine (NORVASC) 5 mg tablet Take 1 tablet (5 mg total) by mouth daily, Starting Thu 5/29/2025, Normal      aspirin 81 mg chewable tablet Chew 81 mg in the morning., Historical Med      buPROPion (WELLBUTRIN XL) 150 mg 24 hr tablet Take 1 tablet (150 mg total) by mouth daily, Starting Thu 1/16/2025, Normal      moxifloxacin (VIGAMOX) 0.5 % ophthalmic solution Instill 1 drop in left eye 4 times a day after your eye procedure, Normal      Multiple Vitamins-Minerals (MULTIVITAMIN WITH MINERALS) tablet Take 2 tablets by mouth in the morning., Historical Med      prednisoLONE acetate (PRED FORTE) 1 % ophthalmic suspension Instill 1 drop in left eye 4 times/day following your eye procedure (shake well), Normal      rosuvastatin (CRESTOR) 10 MG tablet Take 1 tablet (10 mg total) by mouth daily, Starting Thu 1/16/2025, Normal           No discharge procedures on file.       ED Provider  Electronically Signed by     Elias Molina DO  07/03/25 0035

## 2025-07-18 ENCOUNTER — OFFICE VISIT (OUTPATIENT)
Age: 65
End: 2025-07-18

## 2025-07-18 DIAGNOSIS — Z09 POSTOP CHECK: Primary | ICD-10-CM

## 2025-07-18 PROCEDURE — 99024 POSTOP FOLLOW-UP VISIT: CPT | Performed by: OPHTHALMOLOGY

## 2025-07-18 NOTE — PROGRESS NOTES
Name: Allyson Nagy      : 1960      MRN: [id number]  Encounter Provider: Yehuda Amaro DO  Encounter Date: 2025   Encounter department: [facility] OPHTHALMOLOGY  :  Assessment & Plan  Postop check  -doing well, has healed well  -does have some pco - would monitor given good bcva os  -new mrx provided  Return in about 1 year (around 2026).           Allyson Nagy is a 65 y.o. female who presents for POV s/p phaco/PCIOL OS (25).    Reports vision overall doing well, but cannot see with current glasses out of OS.  Denies pain.  Reports tapering off of drops as directed.    History obtained from: patient    Review of Systems  Medical History Reviewed by provider this encounter:       HPI     Post-op     Additional comments: POV s/p phaco/PCIOL OS (25)           Comments    POW 5- s/p phaco/pciol os  -seems to be doing ok            Last edited by Yehuda Amaro DO on 2025  8:27 AM.             Past Ocular History: pseudo OS, Cataract OD  Ocular Meds/Drops: none    Base Eye Exam     Visual Acuity (Snellen - Linear)       Right Left    Dist sc  20/20-    Dist cc 20/20-2     Correction: Glasses          Neuro/Psych     Oriented x3: Yes    Mood/Affect: Normal            Additional Tests     Keratometry       K1 Axis K2 Axis    Right 42.00 174 42.50 84    Left 41.75 67 42.25 157            Slit Lamp and Fundus Exam     External Exam       Right Left    External Normal Normal          Slit Lamp Exam       Right Left    Lids/Lashes Normal Normal    Conjunctiva/Sclera White and quiet White and quiet    Cornea Clear Clear    Anterior Chamber Deep and quiet Deep and quiet    Iris Round and reactive r/r    Lens +1nsc, tr psc +1pco    Anterior Vitreous No PVD, clear, no cell No PVD, clear, no cell          Fundus Exam       Right Left    Disc  sharp, no pallor    C/D Ratio  ~0.25    Macula  +FLR            Refraction     Wearing Rx       Sphere Cylinder Axis Add    Right +2.50 -1.00  111 +2.75    Left +2.25 -1.25 073 +2.75    Age: 3+ years    Type: Progressive          Manifest Refraction (Auto)       Sphere Cylinder Axis Dist VA Add    Right +2.50 -0.50 120      Left +0.50 -1.25 069            Manifest Refraction #2       Sphere Cylinder Axis Dist VA Add    Right +2.50 -1.00 110 20/20 +2.75    Left +0.25 -0.75 115 20/20 +2.75          Final Rx       Sphere Cylinder Axis Dist VA Add    Right +2.50 -1.00 110 20/20 +2.75    Left +0.25 -0.75 115 20/20 +2.75    Expiration Date: 7/18/2026                  IMAGING:

## (undated) DEVICE — PREP PAD BNS: Brand: CONVERTORS

## (undated) DEVICE — GLOVE INDICATOR PI UNDERGLOVE SZ 7.5 BLUE

## (undated) DEVICE — COBAN 4 IN STERILE

## (undated) DEVICE — GLOVE INDICATOR PI UNDERGLOVE SZ 8 BLUE

## (undated) DEVICE — 2000CC GUARDIAN II: Brand: GUARDIAN

## (undated) DEVICE — GAUZE SPONGES,16 PLY: Brand: CURITY

## (undated) DEVICE — CHLORAPREP HI-LITE 26ML ORANGE

## (undated) DEVICE — BETHLEHEM UNIVERSAL  MIONR EXT: Brand: CARDINAL HEALTH

## (undated) DEVICE — POV-IOD SOLUTION 4OZ BT

## (undated) DEVICE — K-WIRE TROCAR POINT BOTH ENDS .045                                    X 4
Type: IMPLANTABLE DEVICE | Site: FOOT | Status: NON-FUNCTIONAL
Removed: 2021-09-10

## (undated) DEVICE — GLOVE SRG BIOGEL 7.5

## (undated) DEVICE — NEEDLE 18 G X 1 1/2

## (undated) DEVICE — GUIDEWIRE 1.1X100MM SGL TROCAR
Type: IMPLANTABLE DEVICE | Site: FOOT | Status: NON-FUNCTIONAL
Brand: VILEX GUIDEWIRE
Removed: 2021-09-10

## (undated) DEVICE — INTENDED FOR TISSUE SEPARATION, AND OTHER PROCEDURES THAT REQUIRE A SHARP SURGICAL BLADE TO PUNCTURE OR CUT.: Brand: BARD-PARKER ® CARBON RIB-BACK BLADES

## (undated) DEVICE — SUT VICRYL 4-0 PS-2 27 IN J426H

## (undated) DEVICE — SYRINGE 10ML LL

## (undated) DEVICE — WIPES INSTRUMENT 2-7/8  X 2-7/8 IN VISI-WIPE

## (undated) DEVICE — ASTOUND STANDARD SURGICAL GOWN, XL: Brand: CONVERTORS

## (undated) DEVICE — CAST PADDING 4 IN SYNTHETIC NON-STRL

## (undated) DEVICE — SUT ETHILON 4-0 PS-2 18 IN 1667H

## (undated) DEVICE — CURITY NON-ADHERENT STRIPS: Brand: CURITY

## (undated) DEVICE — UTILITY MARKER,BLACK WITH LABELS: Brand: DEVON

## (undated) DEVICE — PREMIUM DRY TRAY LF: Brand: MEDLINE INDUSTRIES, INC.

## (undated) DEVICE — SPECIMEN CONTAINER STERILE PEEL PACK

## (undated) DEVICE — NEEDLE 30 G X 1/2

## (undated) DEVICE — COTTON TIP APPLICATOR 12PK 3IN

## (undated) DEVICE — BLADE SAGITTAL 25.6 X 9.5MM

## (undated) DEVICE — SPEED 2.0MM INSTRUMENT KIT COMPREHENSIVE

## (undated) DEVICE — COTTON TIP APPLICTOR 2 PK

## (undated) DEVICE — STOCKINETTE REGULAR

## (undated) DEVICE — DRILL,  2.2X100MM QC: Brand: VILEX DRILL CANNULATED

## (undated) DEVICE — CUFF TOURNIQUET 18 X 4 IN QUICK CONNECT DISP 1 BLADDER

## (undated) DEVICE — Device

## (undated) DEVICE — STRETCH BANDAGE: Brand: CURITY

## (undated) DEVICE — SUT VICRYL 3-0 PS-2 27 IN J427H

## (undated) DEVICE — SOLUTION BOWL: Brand: KENDALL

## (undated) DEVICE — SCD SEQUENTIAL COMPRESSION COMFORT SLEEVE MEDIUM KNEE LENGTH: Brand: KENDALL SCD

## (undated) DEVICE — NEEDLE 25G X 1 1/2

## (undated) DEVICE — SYRINGE 3ML LL

## (undated) DEVICE — PLUMEPEN PRO 10FT

## (undated) DEVICE — 10FR FRAZIER SUCTION HANDLE: Brand: CARDINAL HEALTH